# Patient Record
Sex: FEMALE | Race: WHITE
[De-identification: names, ages, dates, MRNs, and addresses within clinical notes are randomized per-mention and may not be internally consistent; named-entity substitution may affect disease eponyms.]

---

## 2017-02-14 ENCOUNTER — HOSPITAL ENCOUNTER (EMERGENCY)
Dept: HOSPITAL 75 - ER | Age: 72
Discharge: HOME | End: 2017-02-14
Payer: MEDICARE

## 2017-02-14 VITALS — WEIGHT: 158 LBS | HEIGHT: 60 IN | BODY MASS INDEX: 31.02 KG/M2

## 2017-02-14 VITALS — SYSTOLIC BLOOD PRESSURE: 142 MMHG | DIASTOLIC BLOOD PRESSURE: 72 MMHG

## 2017-02-14 DIAGNOSIS — G62.9: ICD-10-CM

## 2017-02-14 DIAGNOSIS — R29.6: ICD-10-CM

## 2017-02-14 DIAGNOSIS — S06.0X0A: Primary | ICD-10-CM

## 2017-02-14 DIAGNOSIS — R42: ICD-10-CM
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
## 2017-02-14 DIAGNOSIS — Z79.899: ICD-10-CM
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
## 2017-02-14 DIAGNOSIS — Z79.82: ICD-10-CM

## 2017-02-14 PROCEDURE — 99282 EMERGENCY DEPT VISIT SF MDM: CPT

## 2017-02-14 PROCEDURE — 72125 CT NECK SPINE W/O DYE: CPT

## 2017-02-14 PROCEDURE — 70450 CT HEAD/BRAIN W/O DYE: CPT

## 2017-02-14 NOTE — DIAGNOSTIC IMAGING REPORT
PROCEDURE: CT head and CT cervical spine without contrast.



TECHNIQUE: Multiple contiguous axial images were obtained

through the brain and cervical spine without the use of

intravenous contrast. Sagittal and coronal reformations through

the cervical spine were then performed.



INDICATION: Patient fell and hit head with left-sided head pain

and neck pain.



COMPARISON: Head CT 03/05/2014. CTA of the neck 03/04/2015.



DISCUSSION:



Head: No adverse interval change. No intracranial hemorrhage,

mass, midline shift, or hydrocephalus. The ventricles and sulci

are normal size and configuration for age. The visualized

orbits, paranasal sinuses, mastoid air cells, and calvarium are

unremarkable.



Cervical spine: Moderate degenerative changes of the cervical

spine appear stable. No acute fracture, subluxation, or other

osseous abnormality identified. Alignment is anatomic. Soft

tissues are unremarkable.



IMPRESSION:

1. Stable negative head CT.

2. Stable moderate degenerative changes of the cervical spine.

No acute osseous abnormality identified.



Dictated by:



Dictated on workstation # [id number]

## 2017-02-14 NOTE — XMS REPORT
Continuity of Care Document

 Created on: 2017



Shanae Nelson

External Reference #: [id number]

: 1945

Sex: Female



Demographics







 Address  [address]

[place]  [address]

 

 Home Phone  [phone]

 

 Preferred Language  English

 

 Marital Status  

 

 Quaker Affiliation  CHR

 

 Race  White or 

 

 Ethnic Group  Not  or 





Author







 Author  [facility]

 

 Organization  [facility]

 

 Address  Unknown

 

 Phone  Unavailable







Support







 Name  Relationship  Address  Phone

 

 , Carole Batista  ECON  Unknown  [phone]

 

 , Lauri Nelson  EC[employer]  Unknown  [phone]







Care Team Providers







 Care Team Member Name  Role  Phone

 

 Karen Shanna  PCP  [phone]







Source Comments

Some departments are not documenting in the electronic medical record.  If you 
do not see the information that you expected, contact Release of Information in 
the Health Information Management department at [phone] for further 
assistance in locating additional records.[facility]



Active Allergies and Adverse Reactions







    



  Allergen   Noted Date   Severity   Reactions   Comments

 

    



  Ampicillin   2015   Medium   RASH, ITCHING 

 

    



  Other   2015   Medium   MENTAL STATUS CHANGES,   Unknown "older muscle



     SEE COMMENTS   relaxant", patient was



      hospitalized with



      reaction

 

    



  Prednisone   2015   Medium   MENTAL STATUS CHANGES 

 

    



  Sulfa (Sulfonamide   2015   Medium   RASH 



  Antibiotics)    

 

    



  Trimethoprim   2015   Medium   RASH, ITCHING 







Current Medications







      



  Prescription   Sig.   Disp.   Refills   Start   End Date   Status



      Date  

 

      



  Cholecalciferol (Vitamin   Take  by mouth daily.       Active



  D3) (VITAMIN D-3) 2,000      



  unit cap      

 

      



  cyanocobalamin(DIL)   Inject  to area(s) as       Active



  (VITAMIN B-12, RUBRAMIN)   directed every 30 days.     



  100 mcg/mL      

 

      



  Fenofibrate Micronized   Take  by mouth daily.       Active



  130 mg cap      

 

      



  nebivolol (BYSTOLIC) 10   Take 10 mg by mouth       Active



  mg tablet   daily.     

 

      



  gabapentin (NEURONTIN)   Take 100 mg by mouth       Active



  100 mg capsule   daily.     

 

      



  simvastatin (ZOCOR) 40 mg   Take 40 mg by mouth at       Active



  tablet   bedtime daily.     

 

      



  estrogens, conjugated   Take 1.25 mg by mouth       Active



  (PREMARIN) 1.25 mg tablet   daily. Patient takes 1     



   1/2 tablets daily for a     



   total dose of 1.875mg     

 

      



  glucosamine(+) 500 mg tab   Take 500 mg by mouth       Active



   daily with breakfast.     

 

      



  dexlansoprazole (+)   Take 60 mg by mouth       Active



  (DEXILANT) 60 mg capsule   daily.     

 

      



  imipramine (TOFRANIL) 50   Take 50 mg by mouth at       Active



  mg tablet   bedtime daily.     

 

      



  ALPRAZolam (XANAX) 0.25   Take 0.25 mg by mouth as       Active



  mg tablet   Needed.     

 

      



  aspirin 81 mg chewable   Take 81 mg by mouth       Active



  tablet   daily.     

 

      



  linagliptin (TRADJENTA) 5   Take 5 mg by mouth daily.       Active



  mg tab      

 

      



  psyllium (METAMUCIL)   Take 1 Packet by mouth   60 Packet   1   20    
Active



  packet   twice daily.     15  

 

      



  senna/docusate   Take 1-2 Tabs by mouth at   60 Tab   3   20    Active



  (SENOKOT-S) 8.6/50 mg   bedtime daily.     15  



  tablet      

 

      



  polyethylene glycol 3350   Take 17 g by mouth daily   1 Bottle   1   20
    Active



  (GLYCOLAX; MIRALAX) 17   as needed.     15  



  gram/dose powder      

 

      



  magnesium oxide (MAG-OX)   Take 400 mg by mouth       Active



  400 mg tablet   daily.     

 

      



  ondansetron (ZOFRAN) 4 mg   Take 1 Tab by mouth every   90 Tab   0   20
    Active



  tablet   8 hours as needed for     15  



   Nausea for up to 90     



   doses.     







Active Problems







 



  Problem   Noted Date

 

 



  Nausea   2015

 

 



  Abdominal bloating   2015

 

 



  Constipation   2015

 

 



  Abdominal pain   2015

 

 



  Weight loss   2015







Social History







    



  Tobacco Use   Types   Packs/Day   Years Used   Date

 

    



  Never Smoker    









   



  Alcohol Use   Drinks/Week   oz/Week   Comments

 

   



  No   







Last Filed Vital Signs







  



  Vital Sign   Reading   Time Taken

 

  



  Blood Pressure   151/77   2015  1:38 PM CDT

 

  



  Pulse   73   2015  1:38 PM CDT

 

  



  Temperature   36.6   C (97.9   F)   2015  1:38 PM CDT

 

  



  Respiratory Rate   14   2015  1:38 PM CDT

 

  



  Height   1.524 m (5')   2015  1:38 PM CDT

 

  



  Weight   68.176 kg (150 lb 4.8 oz)   2015  1:38 PM CDT

 

  



  Body Mass Index   29.35   2015  1:38 PM CDT

 

  



  Oxygen Saturation   100%   2015  4:44 PM CDT







Plan of Care







   



  Health Maintenance   Due Date   Last Done   Comments

 

   



  Physical (Comprehensive)   1952  



  Exam   

 

   



  Pertussis Vaccine   1954  

 

   



  Tetanus Vaccine   1960  

 

   



  Breast Cancer Screening   1983  

 

   



  Shingles Vaccine   2003  

 

   



  Osteoporosis Screening   2008  

 

   



  Prevnar/Pneumovax (#1)   2008  

 

   



  Influenza Vaccine   2016  

 

   



  Colorectal Cancer   2025 



  Screening   







Results from Last 3 Months

Not on file

## 2017-02-14 NOTE — ED HEAD INJURY
General


Chief Complaint:  Trauma-Non Activation


Stated Complaint:  FALL/HEAD INJURY


Nursing Triage Note:  


PT STATES FELL AT HOME D/T WEAKNESS IN HANDS AND FEET, STATES HIT HEAD ON DEEP 


FREEZE. NO LOC


Source:  patient


Exam Limitations:  no limitations





History of Present Illness


Time seen by provider:  12:52


Initial Comments


To ER with reports of a fall at home.  She states that she has neuropathy (

evaluated by PCP) and sometimes her feet don't work as they should.  Today this 

caused her to fall and she hit the frontal and left side of her head on the 

deep freeze.  No neck pain or loss of consciousness.  She does have some 

dizziness and a slight headache.  She states that she does have some dizziness 

but this is an intermittent problem over the past 2 years and has been 

evaluated by her primary care provider.


Location Injury Occurred:  HOME


Occurred:  just prior to arrival


Location:  frontal


Loss of Consciousness:  no loss of consciousness


Associated Systoms:   Headaches





Allergies and Home Medications


Allergies


Coded Allergies:  


     NSAIDS (Non-Steroidal Anti-Inflamma (Verified  Allergy, Intermediate, 3/4/

14)


 STATES SHE GETS 'CRAZY'


     Sulfa (Sulfonamide Antibiotics) (Unverified  Allergy, Unknown, 3/4/14)


     ampicillin (Unverified  Allergy, Unknown, 3/4/14)


     prednisone (Unverified  Adverse Reaction, Unknown, 5/1/14)





Home Medications


Alprazolam 0.25 Mg Tablet 0.25 MG PO TID PRN PRN ANXIETY (Reported) 


   AS NEEDED FOR ANXIETY 


Aspirin 81 Mg Tablet. 81 MG PO DAILY (Reported) 


Cholecalciferol (Vitamin D3) 2,000 Unit Capsule 2,000 UNIT PO DAILY (Reported) 


Ciprofloxacin Hcl 250 Mg Tablet 7Days 250 MG PO BID 


   Prescribed by: KAYLEY MURRAY on 5/2/14 0928


Dexlansoprazole 60 Mg Cap. 60 MG PO BID (Reported) 


Estrogens Conjugated 1.25 Mg Tab 1.25 MG PO DAILY (Reported) 


Fenofibrate,Micronized 130 Mg Capsule 130 MG PO DAILY (Reported) 


Gabapentin 100 Mg Cap 200 MG PO HS (Reported) 


   TAKES 2 (100MG) CAPSULES AT BEDTIME 


Gluc 2KCL/Chondr/Osorio Hy/Hy Ac 1 Each Capsule 1 CAP PO DAILY (Reported) 


Imipramine Hcl 50 Mg Tablet 50 MG PO HS (Reported) 


Nebivolol Hcl 10 Mg Tablet 10 MG PO DAILY (Reported) 


Simvastatin 40 Mg Tablet 20 MG PO DAILY (Reported) 


   TAKES 1/2 (40MG) TABLET DAILY 





Constitutional:   see HPI


Eyes:   No Symptoms Reported


Ears, Nose, Mouth, Throat:   no symptoms reported


Respiratory:   no symptoms reported


Cardiovascular:   no symptoms reported


Genitourinary:   no symptoms reported


Musculoskeletal:   no symptoms reported


Skin:   no symptoms reported


Psychiatric/Neurological:   No Symptoms Reported


Endocrine:   No Symptoms Reported


Hematologic/Lymphatic:   No Symptoms Reported





Past Medical-Social-Family Hx


Patient Social History


Alcohol Use:  Denies Use


Recreational Drug Use:  No


Smoking Status:  Never a Smoker


Recent Foreign Travel:  No


Contact w/Someone Who Travel:  No


Recent Infectious Disease Expo:  No


Recent Hopitalizations:  No





Immunizations Up To Date


Tetanus Booster (TDap):  Unknown


Date of Influenza Vaccine:  Oct 1, 2013





Surgeries


HX Surgeries:  Yes (HYSTERECTOMY, GALLBLADDER, KIDNEY REMOVED, APPENDECTOMY)





Respiratory


Hx Respiratory Disorders:  No





Cardiovascular


Hx Cardiac Disorders:  Yes





Neurological


Hx Neurological Disorders:  No





Reproductive System


Hx Reproductive Disorders:  Yes


GYN History:  Hysterectomy





Genitourinary


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





Gastrointestinal


Hx Gastrointestinal Disorders:  Yes (DUMPING SYNDROME)





Musculoskeletal


Hx Musculoskeletal Disorders:  Yes


Musculoskeletal Disorders:  Arthritis





Endocrine


Hx Endocrine Disorders:  No





HEENT


HX ENT Disorders:  No





Cancer


Hx Cancer:  No





Psychosocial


Hx Psychiatric Problems:  Yes


Behavioral Health Disorders:  Anxiety, Depression





Integumentary


HX Skin/Integumentary Disorder:  No





Blood Transfusions


Hx Blood Disorders:  No


Adverse Reaction to a Blood Tr:  No





Family Medical History


Family Medial History:  


Cataract


  03 FATHER


  03 MOTHER


  09 BROTHER


  09 BROTHER


Dementia


  03 MOTHER


Family history: Allergy


  09 BROTHER


Family history: Arthritis


  03 MOTHER


Family history: Diabetes mellitus


  09 BROTHER


Family history: Glaucoma


  03 MOTHER


Family history: Osteoporosis


  03 MOTHER


Hereditary disease


  03 MOTHER


Prostate cancer


  03 FATHER


Stroke


  03 MOTHER





No Family History of:


  Abdominal aortic aneurysm


  [place]'s disease


  Alcoholism


  Aphasia


  Cancer


  Cancer of colon


  Chest pain


  Congenital heart disease


  Congestive heart failure


  Cystic fibrosis


  Dysphagia


  Family history: Alzheimer's disease


  Family history: Asthma


  Family history: Breast disease


  Family history: Cardiovascular disease


  Family history: Coronary thrombosis


  Family history: Gastrointestinal disease


  Family history: Hypertension


  Family history: Thyroid disorder


  Headache


  Hearing loss


  Heart disease


  History of - anemia


  History of - disorder


  History of - respiratory disease


  History of drug abuse


  Human immunodeficiency virus (HIV) seropositivity


  Hypercholesterolemia


  Infertile


  Kidney disease


  Malignant neoplasm of lung


  Myocardial infarction


  Parkinson's disease


  Psychotic disorder


  Seizure disorder


  Tuberculosis


  Visual impairment





Physical Exam


Vital Signs





 Vital Sign - Last 12Hours








 2/14/17





 12:05


 


Temp 97.9


 


Pulse 66


 


Resp 18


 


B/P 146/81


 


Pulse Ox 95





Capillary Refill : Less Than 3 Seconds


General Appearance:   WD/WN no apparent distress


HEENT:   PERRL/EOMI normal ENT inspection other (there is no palpable scalp 

hematoma or laceration)


Neck:   non-tender full range of motion


Respiratory:   no respiratory distress no accessory muscle use


Gastrointestinal:   normal bowel sounds non tender soft


Extremities:   normal range of motion non-tender


Skin:   normal color warm/dry





Anselmo Coma Score


Best Eye Response:  (4) Open Spontaneously


Best Verbal Response:  (5) Oriented


Best Motor Response:  (6) Obeys Commands


Giorgi Total:  15





Progress/Results/Core Measures


Results/Orders


My Orders





 Orders-KARL MONTELONGO


Ct Head/Cervical Spine Wo (2/14/17 11:58)





Vital Signs/I&O





 Vital Sign - Last 12Hours








 2/14/17





 12:05


 


Temp 97.9


 


Pulse 66


 


Resp 18


 


B/P 146/81


 


Pulse Ox 95








Blood Pressure Mean:  102





Departure


Impression


Impression:  


 Primary Impression:  


 Fall


 Additional Impression:  


 Concussion without loss of consciousness


Disposition:  01 HOME, SELF-CARE


Condition:  Stable





Departure-Patient Inst.


Decision time for Depature:  13:04


Referrals:  


KAYLEY MURRAY DO (PCP/Family)


Primary Care Physician


Patient Instructions:  Concussion, Adult (DC)





Add. Discharge Instructions:


1.  Return to ER for any severe intolerable headaches, nausea vomiting or other 

concerns


2.  Go home and rest.  The basis of concussion management is cognitive and 

physical wrist so nothing physically or mentally stimulating.  Unfortunately, 

concussion symptoms such as dizziness, headache may persist up to 3 weeks.  All 

discharge instructions reviewed with patient and/or family. Voiced 

understanding.








KARL MONTELONGO Feb 14, 2017 12:53

## 2018-05-10 ENCOUNTER — HOSPITAL ENCOUNTER (OUTPATIENT)
Dept: HOSPITAL 75 - RAD | Age: 73
End: 2018-05-10
Attending: FAMILY MEDICINE
Payer: MEDICARE

## 2018-05-10 DIAGNOSIS — M51.36: ICD-10-CM

## 2018-05-10 DIAGNOSIS — M99.73: ICD-10-CM

## 2018-05-10 DIAGNOSIS — M51.26: Primary | ICD-10-CM

## 2018-05-10 DIAGNOSIS — G62.9: ICD-10-CM

## 2018-05-10 PROCEDURE — 72148 MRI LUMBAR SPINE W/O DYE: CPT

## 2018-05-10 NOTE — DIAGNOSTIC IMAGING REPORT
CLINICAL INDICATION: Patient with neuropathy and recently has

been falling and having trouble walking.



Exam: MRI of the lumbar spine performed without IV contrast. 

Sagittal T2, sagittal T1, sagittal stir, axial T1, and axial T2.



Comparison: MRI of the lumbar spine without contrast dated

5/12/2011.



Findings:

Stable mild left curvature of the lumbar spine.



Visualized portions of the distal thoracic spinal cord, conus

medullaris, and cauda equina nerve roots are unremarkable. Conus

medullaris tip is seen at the upper L2 vertebral body level.

There is no significant paraspinal soft tissue abnormality. There

is no acute lumbar spine fracture or dislocation. Again seen

slightly heterogeneous high/low T1/T2 signal. There is Modic type

II degenerative signal changes involving the L3-L4 and L4-L5

levels again noted. There is degenerative spurs and facet

arthropathy seen predominantly involving the lower lumbar spine.



L1-L2 and L2-L3: Unremarkable.



L3-L4: There is no significant change to the diffuse disc bulge

with moderate to severe loss of intervertebral disc height. There

is mild central canal narrowing which is not significantly

progressed. There is mild to moderate right neural foramen

narrowing and mild left neural foramen narrowing which is stable.



L4-L5: Again seen diffuse disc bulge with interval slight

decreased size of the left paracentral disc extrusion/herniation

component. There is slight progression of mild-to-moderate facet

arthropathy and ligamentum flavum buckling. There is moderate

central canal narrowing which is grossly similar. There is no

significant right neural foramen narrowing and stable mild to

moderate left neural foramen narrowing.



L5-S1: There is a diffuse disc bulge with mild loss of

intervertebral disc height and mild bilateral facet arthropathy

which is not significantly changed. Stable mild to moderate

bilateral neural foramen narrowing (right side more than the

left).



IMPRESSION:

1: There is interval decreased size of the left paracentral disc

herniation component of the L4-L5 diffuse disc bulge. There is

slight progression of facet arthropathy and ligamentum flavum

buckling. There is relatively stable moderate central canal

narrowing. There is also stable mild to moderate left neural

foramen narrowing.



2: The remainder of the lumbar spine degenerative disease has not

significantly changed.



Dictated by: 



  Dictated on workstation # [id number]

## 2019-07-18 ENCOUNTER — HOSPITAL ENCOUNTER (OUTPATIENT)
Dept: HOSPITAL 75 - RAD | Age: 74
End: 2019-07-18
Attending: FAMILY MEDICINE
Payer: MEDICARE

## 2019-07-18 DIAGNOSIS — R16.0: ICD-10-CM

## 2019-07-18 DIAGNOSIS — R93.2: Primary | ICD-10-CM

## 2019-07-18 DIAGNOSIS — Z90.49: ICD-10-CM

## 2019-07-18 PROCEDURE — 76705 ECHO EXAM OF ABDOMEN: CPT

## 2019-07-18 NOTE — DIAGNOSTIC IMAGING REPORT
Clinical indication: Patient with elevated liver function test.



Exam: Right upper quadrant ultrasound.



Comparison: CT scan of the abdomen and pelvis without contrast

dated 9/22/2014.



Findings:

The liver measures 19.5 cm. There is diffuse hyperechogenicity

seen throughout the liver. The liver surface appears slightly

lobulated. There is no liver mass seen. There is no intrahepatic

or extrahepatic ductal dilation. Common bile measures 7.4 mm. The

gallbladder surgically resected. Portion of the pancreatic tail

is partially obscured. Otherwise visualized portion of the

pancreas is unremarkable. The IVC and aorta are not well

visualized on this exam due to patient body habitus and increased

echogenicity throughout the liver. There is abdominal ascites.



Right kidney shows no mass or hydronephrosis and is grossly

unremarkable. Right kidney measures 10.1 cm in craniocaudal

dimension.



Impression:

1: Hepatomegaly with diffuse hyperechogenicity seen throughout

the liver which may be related to diffuse fatty infiltration and

or chronic hepatocellular disease. There is a minimally lobulated

appearance to the liver surface. This finding may be seen with

cirrhosis. Clinical correlation is suggested.



2: Cholecystectomy.



3:  The remainder of this exam is unremarkable as visualized.



Dictated by: 



  Dictated on workstation # [id number]

## 2020-06-16 ENCOUNTER — HOSPITAL ENCOUNTER (EMERGENCY)
Dept: HOSPITAL 75 - ER | Age: 75
Discharge: HOME | End: 2020-06-16
Payer: MEDICARE

## 2020-06-16 VITALS — WEIGHT: 160.06 LBS | BODY MASS INDEX: 31.42 KG/M2 | HEIGHT: 60 IN

## 2020-06-16 VITALS — SYSTOLIC BLOOD PRESSURE: 178 MMHG | DIASTOLIC BLOOD PRESSURE: 69 MMHG

## 2020-06-16 DIAGNOSIS — Z88.6: ICD-10-CM

## 2020-06-16 DIAGNOSIS — S39.012A: ICD-10-CM

## 2020-06-16 DIAGNOSIS — F32.9: ICD-10-CM

## 2020-06-16 DIAGNOSIS — N18.9: ICD-10-CM
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
## 2020-06-16 DIAGNOSIS — Z88.1: ICD-10-CM

## 2020-06-16 DIAGNOSIS — R19.7: ICD-10-CM

## 2020-06-16 DIAGNOSIS — Z88.8: ICD-10-CM

## 2020-06-16 DIAGNOSIS — Z88.2: ICD-10-CM

## 2020-06-16 DIAGNOSIS — S70.01XA: ICD-10-CM
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
## 2020-06-16 DIAGNOSIS — Z80.42: ICD-10-CM

## 2020-06-16 DIAGNOSIS — S09.90XA: Primary | ICD-10-CM

## 2020-06-16 DIAGNOSIS — D64.9: ICD-10-CM

## 2020-06-16 DIAGNOSIS — F41.9: ICD-10-CM

## 2020-06-16 DIAGNOSIS — Z79.82: ICD-10-CM

## 2020-06-16 DIAGNOSIS — S16.1XXA: ICD-10-CM
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
## 2020-06-16 LAB
ALBUMIN SERPL-MCNC: 3.9 GM/DL (ref 3.2–4.5)
ALP SERPL-CCNC: 100 U/L (ref 40–136)
ALT SERPL-CCNC: 28 U/L (ref 0–55)
APTT PPP: YELLOW S
BACTERIA #/AREA URNS HPF: (no result) /HPF
BASOPHILS # BLD AUTO: 0 10^3/UL (ref 0–0.1)
BASOPHILS NFR BLD AUTO: 0 % (ref 0–10)
BILIRUB SERPL-MCNC: 0.3 MG/DL (ref 0.1–1)
BILIRUB UR QL STRIP: NEGATIVE
BUN/CREAT SERPL: 12
CALCIUM SERPL-MCNC: 9.8 MG/DL (ref 8.5–10.1)
CHLORIDE SERPL-SCNC: 105 MMOL/L (ref 98–107)
CO2 SERPL-SCNC: 20 MMOL/L (ref 21–32)
CREAT SERPL-MCNC: 1.7 MG/DL (ref 0.6–1.3)
EOSINOPHIL # BLD AUTO: 0 10^3/UL (ref 0–0.3)
EOSINOPHIL NFR BLD AUTO: 0 % (ref 0–10)
ERYTHROCYTE [DISTWIDTH] IN BLOOD BY AUTOMATED COUNT: 15.3 % (ref 10–14.5)
FIBRINOGEN PPP-MCNC: CLEAR MG/DL
GFR SERPLBLD BASED ON 1.73 SQ M-ARVRAT: 29 ML/MIN
GLUCOSE SERPL-MCNC: 183 MG/DL (ref 70–105)
GLUCOSE UR STRIP-MCNC: NEGATIVE MG/DL
HCT VFR BLD CALC: 32 % (ref 35–52)
HGB BLD-MCNC: 10 G/DL (ref 11.5–16)
KETONES UR QL STRIP: NEGATIVE
LEUKOCYTE ESTERASE UR QL STRIP: NEGATIVE
LYMPHOCYTES # BLD AUTO: 1.6 X 10^3 (ref 1–4)
LYMPHOCYTES NFR BLD AUTO: 26 % (ref 12–44)
MANUAL DIFFERENTIAL PERFORMED BLD QL: NO
MCH RBC QN AUTO: 26 PG (ref 25–34)
MCHC RBC AUTO-ENTMCNC: 31 G/DL (ref 32–36)
MCV RBC AUTO: 83 FL (ref 80–99)
MONOCYTES # BLD AUTO: 0.5 X 10^3 (ref 0–1)
MONOCYTES NFR BLD AUTO: 8 % (ref 0–12)
NEUTROPHILS # BLD AUTO: 4.2 X 10^3 (ref 1.8–7.8)
NEUTROPHILS NFR BLD AUTO: 66 % (ref 42–75)
NITRITE UR QL STRIP: NEGATIVE
PH UR STRIP: 6 [PH] (ref 5–9)
PLATELET # BLD: 220 10^3/UL (ref 130–400)
PMV BLD AUTO: 11.7 FL (ref 7.4–10.4)
POTASSIUM SERPL-SCNC: 4.8 MMOL/L (ref 3.6–5)
PROT SERPL-MCNC: 7.2 GM/DL (ref 6.4–8.2)
PROT UR QL STRIP: NEGATIVE
RBC #/AREA URNS HPF: (no result) /HPF
SODIUM SERPL-SCNC: 136 MMOL/L (ref 135–145)
SP GR UR STRIP: <=1.005 (ref 1.02–1.02)
SQUAMOUS #/AREA URNS HPF: (no result) /HPF
WBC # BLD AUTO: 6.4 10^3/UL (ref 4.3–11)
WBC #/AREA URNS HPF: (no result) /HPF
YEAST #/AREA URNS HPF: (no result) /HPF

## 2020-06-16 PROCEDURE — 71045 X-RAY EXAM CHEST 1 VIEW: CPT

## 2020-06-16 PROCEDURE — 70450 CT HEAD/BRAIN W/O DYE: CPT

## 2020-06-16 PROCEDURE — 72128 CT CHEST SPINE W/O DYE: CPT

## 2020-06-16 PROCEDURE — 80053 COMPREHEN METABOLIC PANEL: CPT

## 2020-06-16 PROCEDURE — 72125 CT NECK SPINE W/O DYE: CPT

## 2020-06-16 PROCEDURE — 85025 COMPLETE CBC W/AUTO DIFF WBC: CPT

## 2020-06-16 PROCEDURE — 81000 URINALYSIS NONAUTO W/SCOPE: CPT

## 2020-06-16 PROCEDURE — 93041 RHYTHM ECG TRACING: CPT

## 2020-06-16 PROCEDURE — 72131 CT LUMBAR SPINE W/O DYE: CPT

## 2020-06-16 PROCEDURE — 36415 COLL VENOUS BLD VENIPUNCTURE: CPT

## 2020-06-16 NOTE — DIAGNOSTIC IMAGING REPORT
INDICATION: Fall with chest discomfort.



Frontal chest obtained at 0245 p.m. and compared to 09/29/2016.



Heart is borderline in size. Mediastinal silhouette is

unremarkable. The lungs are clear. There is no pneumothorax or

pleural fluid. There is no overt bony abnormality in the chest.



IMPRESSION:



Borderline heart size with no acute process in the chest.



Dictated by: 



  Dictated on workstation # [id number]

## 2020-06-16 NOTE — NUR
PER PT REQUEST, THIS RN CONTACTED , YOLANDA PAZ, TO UPDATE ON CONDITION 
OF PT. YOLANDA VOICES NO FURTHER QUESTIONS OR CONCERNS. 



([phone])

## 2020-06-16 NOTE — ED FALL/INJURY
General


Stated Complaint:  FALL


Source:  patient





History of Present Illness


Date Seen by Provider:  Jun 16, 2020


Time Seen by Provider:  13:47


Initial Comments


PT ARRIVES VIA POV FROM DR. MURRAY'S OFFICE


PT HAD A SCHEDULED ROUTINE APPOINTMENT TODAY, AND 45 MINUTES AGO, WHILE SHE WAS 

AT HOME, SHE LOST HER BALANCE AND FELL, HITTING HER HEAD, LANDING ON CARPETED 

FLOOR, ONTO HER BACK AND RIGHT SIDE. 


NO LOSS OF CONSCIOUSNESS


NO NECK PAIN 


C/O BLURRY VISION


C/O RIGHT SIDED HEADACHE


C/O RIGHT HIP PAIN 





NO CHANGE IN CHRONIC BACK PAIN


HAS CHRONIC HIP PAIN, AND STATES IT IS NOT ANY DIFFERENT THAN NORMAL





HAS PERIPHERAL NEUROPATHY, AND NO INCREASE IN SYMPTOMS





MUCH LATER, PT C/O GENERALIZED WEAKNESS


MUCH LATER, SHE REPORTS THAT SHE HAS HAD DIARRHEA FOR THE LAST COUPLE OF 

DAYS--NO BLACK/BLOODY/TARRY STOOLS


NO NAUSEA/VOMITING


NO ABDOMINAL PAIN 


NO FEVER


NO URINARY SYMPTOMS AND VOIDING A NORMAL AMOUNT. 





CERVICAL COLLAR PLACED ON ARRIVAL





PCP: DR. MURRAY





Allergies and Home Medications


Allergies


Coded Allergies:  


     NSAIDS (Non-Steroidal Anti-Inflamma (Verified  Allergy, Intermediate, 

3/4/14)


   


   STATES SHE GETS 'CRAZY'


     Sulfa (Sulfonamide Antibiotics) (Unverified  Allergy, Unknown, 3/4/14)


     ampicillin (Unverified  Allergy, Unknown, 3/4/14)


     prednisone (Unverified  Adverse Reaction, Unknown, 5/1/14)





Home Medications


Alprazolam 0.25 Mg Tablet, 0.25 MG PO TID PRN for ANXIETY, (Reported)


   AS NEEDED FOR ANXIETY 


Aspirin 81 Mg Tablet., 81 MG PO DAILY, (Reported)


Cholecalciferol (Vitamin D3) 2,000 Unit Capsule, 2,000 UNIT PO DAILY, (Reported)


Ciprofloxacin Hcl 250 Mg Tablet, 250 MG PO BID


   Prescribed by: KAYLEY MURARY on 5/2/14 0928


Dexlansoprazole 60 Mg Cap., 60 MG PO BID, (Reported)


Estrogens Conjugated 1.25 Mg Tab, 1.25 MG PO DAILY, (Reported)


Fenofibrate,Micronized 130 Mg Capsule, 130 MG PO DAILY, (Reported)


Gabapentin 100 Mg Cap, 200 MG PO HS, (Reported)


   TAKES 2 (100MG) CAPSULES AT BEDTIME 


Gluc 2KCL/Chondr/Osorio Hy/Hy Ac 1 Each Capsule, 1 CAP PO DAILY, (Reported)


Imipramine Hcl 50 Mg Tablet, 50 MG PO HS, (Reported)


Nebivolol Hcl 10 Mg Tablet, 10 MG PO DAILY, (Reported)


Simvastatin 40 Mg Tablet, 20 MG PO DAILY, (Reported)


   TAKES 1/2 (40MG) TABLET DAILY 





Patient Home Medication List


Home Medication List Reviewed:  Yes





Review of Systems


Review of Systems


Constitutional:  see HPI; No chills, No diaphoresis, No dizziness, No fever; 

malaise, weakness


Eyes:  No Symptoms Reported


Ears, Nose, Mouth, Throat:  no symptoms reported


Respiratory:  no symptoms reported


Cardiovascular:  no symptoms reported


Gastrointestinal:  see HPI; No abdominal pain; diarrhea; No nausea, No vomiting


Genitourinary:  no symptoms reported


Musculoskeletal:  see HPI


Skin:  no symptoms reported


Psychiatric/Neurological:  See HPI; Denies Headache; Pre-Existing Deficit 

(PERIPHERAL NEUROPATHY)





Past Medical-Social-Family Hx


Past Med/Social Hx:  Reviewed and Corrections made


Patient Social History


Alcohol Use:  Denies Use


Recreational Drug Use:  No


Smoking Status:  Never a Smoker


Recent Hopitalizations:  No





Immunizations Up To Date


Tetanus Booster (TDap):  Unknown


Date of Influenza Vaccine:  Oct 1, 2013





Past Medical History


Surgeries:  Yes


Hysterectomy


Respiratory:  No


Cardiac:  Yes


High Cholesterol, Hypertension


Neurological:  Yes


Neuropathy


Reproductive Disorders:  Yes


GYN History:  Hysterectomy, Menopausal


Genitourinary:  Yes (CHRONIC RENAL INSUFFICIENCY)


Gastrointestinal:  No


Musculoskeletal:  Yes (CHRONIC HIP PAIN )


Arthritis, Chronic Back Pain


Endocrine:  No


HEENT:  No


Psychosocial:  Yes


Anxiety, Depression


Integumentary:  No


Blood Disorders:  Yes (CHRONIC ANEMIA)


Adverse Reaction/Blood Tranf:  No





Family Medical History





Cataract


  03 FATHER


  03 MOTHER


  09 BROTHER


  09 BROTHER


Dementia


  03 MOTHER


Family history: Allergy


  09 BROTHER


Family history: Arthritis


  03 MOTHER


Family history: Diabetes mellitus


  09 BROTHER


Family history: Glaucoma


  03 MOTHER


Family history: Osteoporosis


  03 MOTHER


Hereditary disease


  03 MOTHER


Prostate cancer


  03 FATHER


Stroke


  03 MOTHER





No Family History of:


  Abdominal aortic aneurysm


  Dixon's disease


  Alcoholism


  Aphasia


  Cancer


  Cancer of colon


  Chest pain


  Congenital heart disease


  Congestive heart failure


  Cystic fibrosis


  Dysphagia


  Family history: Alzheimer's disease


  Family history: Asthma


  Family history: Breast disease


  Family history: Cardiovascular disease


  Family history: Coronary thrombosis


  Family history: Gastrointestinal disease


  Family history: Hypertension


  Family history: Thyroid disorder


  Headache


  Hearing loss


  Heart disease


  History of - anemia


  History of - disorder


  History of - respiratory disease


  History of drug abuse


  Human immunodeficiency virus (HIV) seropositivity


  Hypercholesterolemia


  Infertile


  Kidney disease


  Malignant neoplasm of lung


  Myocardial infarction


  Parkinson's disease


  Psychotic disorder


  Seizure disorder


  Tuberculosis


  Visual impairment





Physical Exam


Vital Signs





Vital Signs - First Documented








 6/16/20





 13:45


 


Temp 36.8


 


Pulse 81


 


Resp 18


 


B/P (MAP) 160/65 (96)


 


Pulse Ox 99


 


O2 Delivery Room Air





Capillary Refill :


Height, Weight, BMI


Height: 5'0.00"


Weight: 158lbs. 0.0oz. [id number]; 30.85 BMI


Method:Stated


General Appearance:  WD/WN, no apparent distress


HEENT:  PERRL/EOMI, normal ENT inspection, TMs normal, pharynx normal, other (NO

EXTERNAL EVIDENCE OF TRAUMA TO HEAD)


Neck:  non-tender, other (CERVICAL COLLAR APPLIED ON ARRIVAL)


Cardiovascular:  normal peripheral pulses, regular rate, rhythm, no edema, no 

JVD, no murmur


Respiratory:  chest non-tender, normal breath sounds, no respiratory distress, 

no accessory muscle use


Peripheral Pulses:  1+ Dorsalis Pedis (R), 1+ Left Dors-Pedis (L), 1+ Radial 

Pulses (R), 1+ Radial Pulses (L)


Gastrointestinal:  normal bowel sounds, non tender, soft


Back:  no CVA tenderness, other (MID AND LOWER BACK TENDERNESS)


Extremities:  no pedal edema, no calf tenderness, normal capillary refill, other

(RGHT HIP TENDERNESS)


Neurologic/Psychiatric:  CNs II-XII nml as tested, no motor/sensory deficits, 

alert, normal mood/affect, oriented x 3


Skin:  normal color, warm/dry; No ecchymosis; other (NO EXTERNAL EVIDENCE OF 

TRAUMA ANYWHERE)





Hoven Coma Score


Best Eye Response:  (4) Open Spontaneously


Best Verbal Response:  (5) Oriented


Best Motor Response:  (6) Obeys Commands


Giorgi Total:  15





Progress/Results/Core Measures


Results/Orders


Lab Results





Laboratory Tests








Test


 6/16/20


14:05 6/16/20


15:20 Range/Units


 


 


White Blood Count


 6.4 


 


 4.3-11.0


10^3/uL


 


Red Blood Count


 3.88 L


 


 4.35-5.85


10^6/uL


 


Hemoglobin 10.0 L  11.5-16.0  G/DL


 


Hematocrit 32 L  35-52  %


 


Mean Corpuscular Volume 83   80-99  FL


 


Mean Corpuscular Hemoglobin 26   25-34  PG


 


Mean Corpuscular Hemoglobin


Concent 31 L


 


 32-36  G/DL





 


Red Cell Distribution Width 15.3 H  10.0-14.5  %


 


Platelet Count


 220 


 


 130-400


10^3/uL


 


Mean Platelet Volume 11.7 H  7.4-10.4  FL


 


Neutrophils (%) (Auto) 66   42-75  %


 


Lymphocytes (%) (Auto) 26   12-44  %


 


Monocytes (%) (Auto) 8   0-12  %


 


Eosinophils (%) (Auto) 0   0-10  %


 


Basophils (%) (Auto) 0   0-10  %


 


Neutrophils # (Auto) 4.2   1.8-7.8  X 10^3


 


Lymphocytes # (Auto) 1.6   1.0-4.0  X 10^3


 


Monocytes # (Auto) 0.5   0.0-1.0  X 10^3


 


Eosinophils # (Auto)


 0.0 


 


 0.0-0.3


10^3/uL


 


Basophils # (Auto)


 0.0 


 


 0.0-0.1


10^3/uL


 


Sodium Level 136   135-145  MMOL/L


 


Potassium Level 4.8   3.6-5.0  MMOL/L


 


Chloride Level 105     MMOL/L


 


Carbon Dioxide Level 20 L  21-32  MMOL/L


 


Anion Gap 11   5-14  MMOL/L


 


Blood Urea Nitrogen 21 H  7-18  MG/DL


 


Creatinine


 1.70 H


 


 0.60-1.30


MG/DL


 


Estimat Glomerular Filtration


Rate 29 


 


  





 


BUN/Creatinine Ratio 12    


 


Glucose Level 183 H    MG/DL


 


Calcium Level 9.8   8.5-10.1  MG/DL


 


Corrected Calcium 9.9   8.5-10.1  MG/DL


 


Total Bilirubin 0.3   0.1-1.0  MG/DL


 


Aspartate Amino Transf


(AST/SGOT) 46 H


 


 5-34  U/L





 


Alanine Aminotransferase


(ALT/SGPT) 28 


 


 0-55  U/L





 


Alkaline Phosphatase 100     U/L


 


Total Protein 7.2   6.4-8.2  GM/DL


 


Albumin 3.9   3.2-4.5  GM/DL


 


Urine Color  YELLOW   


 


Urine Clarity  CLEAR   


 


Urine pH  6.0  5-9  


 


Urine Specific Gravity  <=1.005  1.016-1.022  


 


Urine Protein  NEGATIVE  NEGATIVE  


 


Urine Glucose (UA)  NEGATIVE  NEGATIVE  


 


Urine Ketones  NEGATIVE  NEGATIVE  


 


Urine Nitrite  NEGATIVE  NEGATIVE  


 


Urine Bilirubin  NEGATIVE  NEGATIVE  


 


Urine Urobilinogen  0.2  < = 1.0  MG/DL


 


Urine Leukocyte Esterase  NEGATIVE  NEGATIVE  


 


Urine RBC (Auto)  NEGATIVE  NEGATIVE  


 


Urine RBC  NONE   /HPF


 


Urine WBC  NONE   /HPF


 


Urine Squamous Epithelial


Cells 


 2-5 


  /HPF





 


Urine Crystals  NONE   /LPF


 


Urine Bacteria  FEW H  /HPF


 


Urine Casts  NONE   /LPF


 


Urine Mucus  NEGATIVE   /LPF


 


Urine Yeast  FEW H  /HPF


 


Urine Culture Indicated  NO   








My Orders





Orders - GRACIE PELAEZ DO


Ct Head/Cervical Spine Wo (6/16/20 13:43)


Ct Thoracic/Lumbar Spine Wo (6/16/20 13:43)


Monitor-Rhythm Ecg Trace Only (6/16/20 13:43)


Cervical Collar (6/16/20 13:43)


Cbc With Automated Diff (6/16/20 13:43)


Comprehensive Metabolic Panel (6/16/20 13:43)


Ua Culture If Indicated (6/16/20 13:43)


Chest 1 View, Ap/Pa Only (6/16/20 13:43)


Pelvis With Right Hip 2-3views (6/16/20 13:43)


Ed Iv/Invasive Line Start (6/16/20 15:06)


Lactated Ringers (Lr 1000 Ml Iv Solution (6/16/20 15:06)


Ed Iv/Invasive Line Start (6/16/20 15:21)


Lactated Ringers (Lr 1000 Ml Iv Solution (6/16/20 15:21)





Medications Given in ED





Vital Signs/I&O











 6/16/20 6/16/20





 13:45 16:43


 


Temp 36.8 36.6


 


Pulse 81 82


 


Resp 18 18


 


B/P (MAP) 160/65 (96) 178/69


 


Pulse Ox 99 99


 


O2 Delivery Room Air Room Air











Progress


Progress Note :  


Progress Note


1515--CERVICAL COLLAR REMOVED ON OBTAINING CT CERVICAL SPINE REPORT BY 

RADIOLOGIST





UNEVENTFUL ER STAY


NO DIARRHEA DURING ER STAY





PT DECLINED SECOND BAG OF IV FLUIDS AND IS ANXIOUS TO GO HOME


STATES SHE FEELS BETTER





Diagnostic Imaging





Comments


CT HEAD/CERVICAL SPINE





IMPRESSION:  


1. No hemorrhage or focal intra-axial mass.  No CT evidence of


large acute territorial ischemia.  


2. No acute fracture or dislocation in the cervical spine.


3. Moderate degenerative changes in the cervical spine, similar


to the prior exam.








CT THORACIC/LUMBAR SPINE


IMPRESSION: 


Progressive L3-L4 spondylosis but no thoracolumbar fracture or


traumatic malalignment. No acute appearing abnormality.








CXR


IMPRESSION:





Borderline heart size with no acute process in the chest.





PELVIS AND RIGHT HIP XRAYS


IMPRESSION: 


1. No acute osseous abnormality with low-grade degenerative


changes.








ALL PER RADIOLOGIST REPORTS AT 1515


   Reviewed:  Reviewed by Me





Departure


Communication (Admissions)


1985--SPOKE WITH DR. MURRAY. SHE REPORTS THAT PT HAS CHRONIC RENAL 

INSUFFICIENCY AND ANEMIA OF CHRONIC DISEASE. SHE DOES NOT ADVISE ADMIT AT THIS 

TIME, SHE WILL FOLLOW UP WITH PT IN OFFICE THIS WEEK,





Impression





   Primary Impression:  


   Status post fall


   Additional Impressions:  


   Closed head injury without loss of consciousness


   Contusion of right hip


   NECK AND BACK STRAIN


   Chronic renal insufficiency


   Chronic anemia


   Generalized weakness


   Diarrhea


Disposition:  01 HOME, SELF-CARE


Condition:  Stable





Departure-Patient Inst.


Referrals:  


KAYLEY MURRAY DO (PCP/Family)


Primary Care Physician


Patient Instructions:  Back Muscle Strain (DC), Cervical Muscle Strain (DC), 

Contusion (DC), Minor Head Injury (DC), Preventing Falls in the Older Adult





Add. Discharge Instructions:  


ICE TO SORE AREAS AT 20 MINUTE INTERVALS





TYLENOL AS NEEDED FOR PAIN 





INCREASE YOUR FLUID INTAKE--WATER, BROTH, JELLO, GATORADE


BRATS DIET--BANANAS, RICE, APPLESAUCE, TOAST, SALTINES





FOLLOW UP WITH DR. MURRAY THIS WEEK FOR FURTHER CARE











GRACIE PELAEZ DO                 Jun 16, 2020 13:46

## 2020-06-16 NOTE — DIAGNOSTIC IMAGING REPORT
PROCEDURE: 

CT thoracic and lumbar spine without contrast.



TECHNIQUE: 

Multiple contiguous axial images were obtained through the

thoracic and lumbar spine without the use of intravenous

contrast. Sagittal and coronal reformations were then performed.

All CT scans use one or more of the following dose optimizing

techniques: automated exposure control, MA and/or KvP adjustment

based on a patient size and exam type, or iterative

reconstruction. 



INDICATION:  

Injury with back pain.



COMPARISON:  

Comparison is limited to a lumbar MRI performed in 2011 and

previous chest x-rays.



FINDINGS:

The thoracolumbar vertebral statures are within normal limits.

There are degenerative changes throughout the thoracolumbar

spine, most severe at the L3-L4 level, where there is marked disc

space narrowing, endplate sclerosis, and osteophytes. When given

the differing modalities, this is significantly progressed from

the comparison MRI performed in 2011. No evidence for

paravertebral hemorrhage and no thoracolumbar fracture is found.

The posterior visualized ribs segments are intact. No

demonstrated pleural fluid. No paraspinal hemorrhage. The SI

joints and visualized sacral ala are intact. 



IMPRESSION: 

Progressive L3-L4 spondylosis but no thoracolumbar fracture or

traumatic malalignment. No acute appearing abnormality.



Dictated by: 



  Dictated on workstation # [id number]

## 2020-06-16 NOTE — DIAGNOSTIC IMAGING REPORT
PROCEDURE: CT head and CT cervical spine without contrast.



TECHNIQUE: Multiple contiguous axial images were obtained through

the brain and cervical spine without the use of intravenous

contrast. Sagittal and coronal reformations through the cervical

spine were then performed. Auto Exposure Controls were utilized

during the CT exam to meet ALARA standards for radiation dose

reduction. 



INDICATION: Fall. Right neck pain. Scalp contusion.



COMPARISON: 02/14/2017.



FINDINGS: 

CT head: No large acute territorial ischemia, mass, or

hemorrhage. No midline shift or mass effect. The ventricles,

cortical sulci, and basilar cisterns are patent and unremarkable.





The calvarium is intact. The visualized paranasal sinuses are

clear.



CT cervical spine: No acute fracture or dislocation is seen in

the cervical spine. There is straightening of the cervical spine.

No focal osseous lesions. Vertebral body heights are

well-maintained. The craniocervical junction is well-maintained.

Moderate degenerative changes are seen in the cervical spine with

disc osteophyte complexes and uncovertebral arthropathy, greatest

at C6-C7. Soft tissues of the neck are unremarkable. The included

lung apices are clear.



IMPRESSION:  

1. No hemorrhage or focal intra-axial mass.  No CT evidence of

large acute territorial ischemia.  

2. No acute fracture or dislocation in the cervical spine.

3. Moderate degenerative changes in the cervical spine, similar

to the prior exam.



Dictated by: 



  Dictated on workstation # [id number]

## 2020-07-20 ENCOUNTER — HOSPITAL ENCOUNTER (OUTPATIENT)
Dept: HOSPITAL 75 - PREOP | Age: 75
Discharge: HOME | End: 2020-07-20
Attending: SURGERY
Payer: MEDICARE

## 2020-07-20 VITALS — BODY MASS INDEX: 31.59 KG/M2 | WEIGHT: 167.33 LBS | HEIGHT: 60.98 IN

## 2020-07-20 DIAGNOSIS — R22.31: ICD-10-CM

## 2020-07-20 DIAGNOSIS — Z01.812: Primary | ICD-10-CM

## 2020-07-20 DIAGNOSIS — Z20.828: ICD-10-CM

## 2020-07-20 PROCEDURE — 87635 SARS-COV-2 COVID-19 AMP PRB: CPT

## 2020-07-22 ENCOUNTER — HOSPITAL ENCOUNTER (OUTPATIENT)
Dept: HOSPITAL 75 - SDC | Age: 75
Discharge: HOME | End: 2020-07-22
Attending: SURGERY
Payer: MEDICARE

## 2020-07-22 VITALS — DIASTOLIC BLOOD PRESSURE: 65 MMHG | SYSTOLIC BLOOD PRESSURE: 154 MMHG

## 2020-07-22 VITALS — DIASTOLIC BLOOD PRESSURE: 57 MMHG | SYSTOLIC BLOOD PRESSURE: 157 MMHG

## 2020-07-22 VITALS — SYSTOLIC BLOOD PRESSURE: 155 MMHG | DIASTOLIC BLOOD PRESSURE: 57 MMHG

## 2020-07-22 VITALS — SYSTOLIC BLOOD PRESSURE: 122 MMHG | DIASTOLIC BLOOD PRESSURE: 57 MMHG

## 2020-07-22 VITALS — SYSTOLIC BLOOD PRESSURE: 157 MMHG | DIASTOLIC BLOOD PRESSURE: 57 MMHG

## 2020-07-22 VITALS — SYSTOLIC BLOOD PRESSURE: 151 MMHG | DIASTOLIC BLOOD PRESSURE: 83 MMHG

## 2020-07-22 VITALS — DIASTOLIC BLOOD PRESSURE: 73 MMHG | SYSTOLIC BLOOD PRESSURE: 156 MMHG

## 2020-07-22 VITALS — DIASTOLIC BLOOD PRESSURE: 56 MMHG | SYSTOLIC BLOOD PRESSURE: 156 MMHG

## 2020-07-22 VITALS — BODY MASS INDEX: 31.59 KG/M2 | WEIGHT: 167.33 LBS | HEIGHT: 60.98 IN

## 2020-07-22 DIAGNOSIS — K76.0: ICD-10-CM

## 2020-07-22 DIAGNOSIS — F41.9: ICD-10-CM

## 2020-07-22 DIAGNOSIS — I10: ICD-10-CM

## 2020-07-22 DIAGNOSIS — E11.9: ICD-10-CM

## 2020-07-22 DIAGNOSIS — Z82.3: ICD-10-CM

## 2020-07-22 DIAGNOSIS — I65.23: ICD-10-CM

## 2020-07-22 DIAGNOSIS — Z88.6: ICD-10-CM

## 2020-07-22 DIAGNOSIS — Z88.1: ICD-10-CM

## 2020-07-22 DIAGNOSIS — Z79.82: ICD-10-CM

## 2020-07-22 DIAGNOSIS — Z80.9: ICD-10-CM

## 2020-07-22 DIAGNOSIS — F32.9: ICD-10-CM

## 2020-07-22 DIAGNOSIS — K21.9: ICD-10-CM

## 2020-07-22 DIAGNOSIS — I08.3: ICD-10-CM

## 2020-07-22 DIAGNOSIS — C44.622: Primary | ICD-10-CM

## 2020-07-22 DIAGNOSIS — E66.9: ICD-10-CM

## 2020-07-22 DIAGNOSIS — Z88.2: ICD-10-CM

## 2020-07-22 DIAGNOSIS — Z79.899: ICD-10-CM

## 2020-07-22 DIAGNOSIS — Z88.8: ICD-10-CM

## 2020-07-22 PROCEDURE — 87081 CULTURE SCREEN ONLY: CPT

## 2020-07-22 NOTE — ANESTHESIA-GENERAL POST-OP
MAC


Patient Condition


Mental Status/LOC:  Same as Preop


Cardiovascular:  Satisfactory


Nausea/Vomiting:  Absent


Respiratory:  Satisfactory


Pain:  Controlled


Complications:  Absent





Post Op Complications


Complications


None





Follow Up Care/Instructions


Patient Instructions


None needed.





Anesthesiology Discharge Order


Discharge Order


Patient is doing well, no complaints, stable vital signs, no apparent adverse 

anesthesia problems.   


No complications reported per nursing.











RAJINDER GARCIA CRNA              Jul 22, 2020 14:54

## 2020-07-22 NOTE — PROGRESS NOTE-POST OPERATIVE
Post-Operative Progess Note


Surgeon (s)/Assistant (s)


Surgeon


JAIR WRIGHT DO


Assistant:  NONE





Pre-Operative Diagnosis


Right hand mass, site marked





Post-Operative Diagnosis





same pending path





Procedure & Operative Findings


Date of Procedure


7/22/20


Procedure Performed/Findings


Exc right hand mass, 3.2 x 1.4cm


Anesthesia Type


MAC





Estimated Blood Loss


Estimated blood loss (mL):  less than 5ml





Specimens/Packing


Specimens Removed


right hand mass











JAIR WRIGHT DO               Jul 22, 2020 14:50

## 2020-07-22 NOTE — PROGRESS NOTE-PRE OPERATIVE
Pre-Operative Progress Note


H&P Reviewed


The H&P was reviewed, patient examined and no changes noted.


Time Seen by Provider:  13:15


Date H&P Reviewed:  Jul 22, 2020


Time H&P Reviewed:  13:15


Pre-Operative Diagnosis:  Right hand mass, site marked











JAIR WRIGHT DO               Jul 22, 2020 13:17

## 2020-07-22 NOTE — DISCHARGE INST-SURGICAL
Discharge Inst-Surgical


Depart Medication/Instructions


New, Converted or Re-Newed RX:  RX Given to Pt/Family


Patient Instructions


Follow up Appt:


Make appointment for 1 week. [phone]





Instructions:


No strenuous activity. 


May shower in 24 hours, no tub bath or soaking.


Use incentive spirometer at home as directed.


No Smoking





Skin/Wound Care:


May remove bandages in am.  You need to leave the Dermabond on incision it will 

fall off on it's own. 





Symptoms to Report:


Appetite Changes, Extremity Discoloration, Numbness/Tingling, Swelling 

Increased, Bleeding Excessive, Eyesight Changes, Pain Increased, Urine Color Nadeen

nge, Constipation(Persistent), Fever over 101 degree F, Pain/Pressure in chest, 

Urinating Difficulty, Cough Up/Vomit Blood, Heart Beat Irreg/Pounding, 

Pain/Pressure in jaw, Cramps in feet or legs, Lightheadedness, Pain/Pressure in 

shoulder, Diarrhea(Persistent), Memory Changes Suddenly, Questions/Concerns, 

Weight gain consecutive days, Dizziness/Fainting, Nausea/Vomiting, Shortness of 

Breath, Weight gain over 2 pounds








If questions or concerns contact your physician 


Or seek help at emergency department.





Activity


Activity as Tolerated:  Yes


Activity Instructions:  Avoid Stress to Incision


Driving Instructions:  No Driving/Refer to Dr. Cook


Discharge Diet:  No Restrictions


Diet After 24 Hours:  Clear Liquid if Nauseous


If Any Problems/Questions/Issu:  Contact Your Physician, Go to Emergency Room





Skin/Wound Care


Infection Signs and Symptoms:  Increased Redness, Foul Odor of Wound, Increased 

Drainage, Skin Itchy or Has a Rash, Increased Swelling, Temperature Above 101  F


Bathing Instructions:  Shower


Stitches/Staples/Dermabond Dis:  Care of Stitches











JAIR WRIGHT DO               Jul 22, 2020 14:51

## 2020-07-23 NOTE — OPERATIVE REPORT
DATE OF SERVICE:  07/22/2020



PREOPERATIVE DIAGNOSIS:

Right hand mass.



POSTOPERATIVE DIAGNOSIS:

Right hand mass, pending pathology.



PROCEDURE:

Excision of right hand mass measuring 3.2 x 1.4 cm down into the subcutaneous

fat.



SURGEON:

Rafael Mendez DO



FIRST ASSISTANT:

None.



ANESTHESIA:

MAC.



SPECIMEN:

Right hand mass measuring 3.2 x 1.4 cm.



BLOOD LOSS:

Less than 5 mL.



FLUIDS:

Per anesthesia.



POSTOPERATIVE CONDITION:

Stable.



INDICATION FOR PROCEDURE:

The patient is a 74-year-old female who has a mass on her right hand that is

getting bigger and has not gone away since removed.



FINDINGS:

The patient had a mass removed and sent to pathology, measured 3.2 x 1.4 cm and

was down through into the subcutaneous fat.



PROCEDURE NOTE:

After informed consent was obtained, the patient was brought to the operating

room, placed on the table in supine position.  She was sterilely prepped and

draped in normal fashion.  Local lidocaine was used to infiltrate the skin

around this mass and then migdalia an elliptical incision with a marking pen and

then using #15 blade to cut along this marking.  This measured 3.2 x 1.4 cm,

able to go down through the skin into subcutaneous tissue, then deepened down to

subcutaneous tissue with Bovie electrocautery.  She is very thin and had very

thin skin, able to remove this, passed off table, marked it superiorly and

laterally, although technically that was actually marked at the 3 o'clock, which

is medial, but would be lateral and dorsal position.  This was passed off the

table.  Hemostasis obtained using Bovie electrocautery and closed the incision

with 3-0 prolene interrupted simple stitches.  Area was then cleaned and dried,

dressing placed.  The patient tolerated the procedure.  She was transferred to

recovery room in stable condition.  Sponge, instrument and needle count correct

at the end of the case.





Job ID: [id number]

DocumentID: [id number]

Dictated Date:  07/22/2020 15:58:52

Transcription Date: 07/23/2020 02:41:24

Dictated By: RAFAEL MENDEZ DO

[facility]

## 2020-09-28 ENCOUNTER — HOSPITAL ENCOUNTER (OUTPATIENT)
Dept: HOSPITAL 75 - LABNPT | Age: 75
End: 2020-09-28
Attending: FAMILY MEDICINE
Payer: MEDICARE

## 2020-09-28 DIAGNOSIS — Z20.828: ICD-10-CM

## 2020-09-28 DIAGNOSIS — Z01.812: Primary | ICD-10-CM

## 2020-09-28 PROCEDURE — 87635 SARS-COV-2 COVID-19 AMP PRB: CPT

## 2020-12-11 NOTE — DIAGNOSTIC IMAGING REPORT
INDICATION: Fall, pain



COMPARISON: 12/15/2010



TECHNIQUE: 3 radiographs of the pelvis and right hip dated

06/16/2020.



FINDINGS: No acute fracture or dislocation. No destructive

osseous process. Mild degenerative changes within the bilateral

hips and pubic symphysis. The right femoral head maintains its

normal shape and contour without collapse. The sacroiliac joints

are intact. No suspicious radiopaque foreign body.



IMPRESSION: 

1. No acute osseous abnormality with low-grade degenerative

changes.



Dictated by: 



  Dictated on workstation # [id number] Patient exposed to her Aunt who was positive for Covid on Sunday. Patient's mother denies symptoms

## 2021-06-06 ENCOUNTER — HOSPITAL ENCOUNTER (EMERGENCY)
Dept: HOSPITAL 75 - ER | Age: 76
Discharge: HOME | End: 2021-06-06
Payer: MEDICARE

## 2021-06-06 VITALS — DIASTOLIC BLOOD PRESSURE: 64 MMHG | SYSTOLIC BLOOD PRESSURE: 189 MMHG

## 2021-06-06 VITALS — HEIGHT: 61.97 IN | BODY MASS INDEX: 29.49 KG/M2 | WEIGHT: 160.28 LBS

## 2021-06-06 DIAGNOSIS — E11.40: ICD-10-CM

## 2021-06-06 DIAGNOSIS — K21.9: ICD-10-CM
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
## 2021-06-06 DIAGNOSIS — I12.9: ICD-10-CM

## 2021-06-06 DIAGNOSIS — F32.9: ICD-10-CM

## 2021-06-06 DIAGNOSIS — R10.30: ICD-10-CM

## 2021-06-06 DIAGNOSIS — Z79.899: ICD-10-CM

## 2021-06-06 DIAGNOSIS — F41.9: ICD-10-CM

## 2021-06-06 DIAGNOSIS — Z79.891: ICD-10-CM

## 2021-06-06 DIAGNOSIS — N18.9: ICD-10-CM

## 2021-06-06 DIAGNOSIS — E87.1: ICD-10-CM

## 2021-06-06 DIAGNOSIS — M79.642: ICD-10-CM

## 2021-06-06 DIAGNOSIS — G89.29: ICD-10-CM

## 2021-06-06 DIAGNOSIS — M54.9: ICD-10-CM

## 2021-06-06 DIAGNOSIS — E11.22: ICD-10-CM

## 2021-06-06 DIAGNOSIS — M54.5: ICD-10-CM

## 2021-06-06 DIAGNOSIS — S00.83XA: Primary | ICD-10-CM

## 2021-06-06 DIAGNOSIS — Z79.84: ICD-10-CM

## 2021-06-06 LAB
ALBUMIN SERPL-MCNC: 3.7 GM/DL (ref 3.2–4.5)
ALP SERPL-CCNC: 64 U/L (ref 40–136)
ALT SERPL-CCNC: 19 U/L (ref 0–55)
APTT PPP: YELLOW S
BACTERIA #/AREA URNS HPF: (no result) /HPF
BASOPHILS # BLD AUTO: 0 10^3/UL (ref 0–0.1)
BASOPHILS NFR BLD AUTO: 0 % (ref 0–10)
BILIRUB SERPL-MCNC: 0.5 MG/DL (ref 0.1–1)
BILIRUB UR QL STRIP: NEGATIVE
BUN/CREAT SERPL: 17
CALCIUM SERPL-MCNC: 9.7 MG/DL (ref 8.5–10.1)
CHLORIDE SERPL-SCNC: 95 MMOL/L (ref 98–107)
CO2 SERPL-SCNC: 21 MMOL/L (ref 21–32)
CREAT SERPL-MCNC: 1.27 MG/DL (ref 0.6–1.3)
EOSINOPHIL # BLD AUTO: 0.1 10^3/UL (ref 0–0.3)
EOSINOPHIL NFR BLD AUTO: 1 % (ref 0–10)
FIBRINOGEN PPP-MCNC: CLEAR MG/DL
GFR SERPLBLD BASED ON 1.73 SQ M-ARVRAT: 41 ML/MIN
GLUCOSE SERPL-MCNC: 127 MG/DL (ref 70–105)
GLUCOSE UR STRIP-MCNC: NEGATIVE MG/DL
HCT VFR BLD CALC: 36 % (ref 35–52)
HGB BLD-MCNC: 12.1 G/DL (ref 11.5–16)
KETONES UR QL STRIP: NEGATIVE
LEUKOCYTE ESTERASE UR QL STRIP: NEGATIVE
LYMPHOCYTES # BLD AUTO: 1.6 10^3/UL (ref 1–4)
LYMPHOCYTES NFR BLD AUTO: 22 % (ref 12–44)
MAGNESIUM SERPL-MCNC: 1.8 MG/DL (ref 1.6–2.4)
MANUAL DIFFERENTIAL PERFORMED BLD QL: NO
MCH RBC QN AUTO: 30 PG (ref 25–34)
MCHC RBC AUTO-ENTMCNC: 34 G/DL (ref 32–36)
MCV RBC AUTO: 89 FL (ref 80–99)
MONOCYTES # BLD AUTO: 0.6 10^3/UL (ref 0–1)
MONOCYTES NFR BLD AUTO: 9 % (ref 0–12)
NEUTROPHILS # BLD AUTO: 4.7 10^3/UL (ref 1.8–7.8)
NEUTROPHILS NFR BLD AUTO: 67 % (ref 42–75)
NITRITE UR QL STRIP: NEGATIVE
PH UR STRIP: 7 [PH] (ref 5–9)
PLATELET # BLD: 153 10^3/UL (ref 130–400)
PMV BLD AUTO: 9.7 FL (ref 9–12.2)
POTASSIUM SERPL-SCNC: 4.7 MMOL/L (ref 3.6–5)
PROT SERPL-MCNC: 7.1 GM/DL (ref 6.4–8.2)
PROT UR QL STRIP: (no result)
RBC #/AREA URNS HPF: (no result) /HPF
SODIUM SERPL-SCNC: 128 MMOL/L (ref 135–145)
SP GR UR STRIP: 1.01 (ref 1.02–1.02)
SQUAMOUS #/AREA URNS HPF: (no result) /HPF
WBC # BLD AUTO: 6.9 10^3/UL (ref 4.3–11)
WBC #/AREA URNS HPF: (no result) /HPF

## 2021-06-06 PROCEDURE — 81000 URINALYSIS NONAUTO W/SCOPE: CPT

## 2021-06-06 PROCEDURE — 72125 CT NECK SPINE W/O DYE: CPT

## 2021-06-06 PROCEDURE — 74176 CT ABD & PELVIS W/O CONTRAST: CPT

## 2021-06-06 PROCEDURE — 71250 CT THORAX DX C-: CPT

## 2021-06-06 PROCEDURE — 73130 X-RAY EXAM OF HAND: CPT

## 2021-06-06 PROCEDURE — 70486 CT MAXILLOFACIAL W/O DYE: CPT

## 2021-06-06 PROCEDURE — 85025 COMPLETE CBC W/AUTO DIFF WBC: CPT

## 2021-06-06 PROCEDURE — 80053 COMPREHEN METABOLIC PANEL: CPT

## 2021-06-06 PROCEDURE — 70450 CT HEAD/BRAIN W/O DYE: CPT

## 2021-06-06 PROCEDURE — 36415 COLL VENOUS BLD VENIPUNCTURE: CPT

## 2021-06-06 PROCEDURE — 73110 X-RAY EXAM OF WRIST: CPT

## 2021-06-06 PROCEDURE — 83735 ASSAY OF MAGNESIUM: CPT

## 2021-06-06 NOTE — DIAGNOSTIC IMAGING REPORT
PROCEDURE: CT head, face, and cervical spine without contrast.



TECHNIQUE: Multiple contiguous axial images were obtained through

the head, neck, and facial bones without the use of intravenous

contrast. Sagittal and coronal reformations through the cervical

spine and facial bones were also performed. Auto Exposure

Controls were utilized during the CT exam to meet ALARA standards

for radiation dose reduction. 



INDICATION: Trauma, fall



COMPARISON: Imaging from the same date as well as from

06/16/2020.



FINDINGS: There is mild atrophy. No intracranial hemorrhage,

intracranial mass, mass effect, midline shift, herniation,

hydrocephalus, or extra-axial fluid collection. Periventricular

and subcortical white matter hypodensities are present, most

consistent with mild background chronic small vessel white matter

ischemic disease. No definite CT evidence of an acute ischemic

infarction. The orbits are unremarkable. The paranasal sinuses

are clear. The calvarium and extracalvarial soft tissues are

unremarkable.



The paranasal sinuses are clear. Zygomatic arches are intact. No

temporomandibular joint dislocation. No acute facial fracture. No

significant nasal septal deviation. The orbits are unremarkable.

Parapharyngeal fat is symmetric and well-maintained. Muscles of

mastication are unremarkable.



Alignment of the cervical spine is well maintained. Alignment of

the atlantooccipital joint is well maintained. Besides endplate

degenerative changes, vertebral body heights are otherwise

well-maintained. Severe disc space height loss at C6/C7 with

associated anterior osteophytes and disc osteophyte complex. No

acute fracture within the cervical spine. Scattered facet joint

degenerative changes and uncovertebral joint hypertrophy are

present. No severe osseous central canal stenosis. No apical

pneumothorax. Mild scattered vascular calcifications. Paraspinal

soft tissues are otherwise unremarkable.



IMPRESSION: 



No acute intracranial abnormality with mild atrophy and mild

background chronic small vessel white matter ischemic disease.



No acute facial fracture.



No acute osseous abnormality within the cervical spine with mild

to moderate multilevel degenerative changes, greatest at the

C6/C7 level.



Dictated by: 



  Dictated on workstation # [id number]

## 2021-06-06 NOTE — ED FALL/INJURY
General


Chief Complaint:  Trauma-Non Activation


Stated Complaint:  FELL


Nursing Triage Note:  


TO ED PER W/C ACCOMPIED BY DAUGHTER WHO REPORTS THAT PATIENT HAS NEUROPATHY IN 


FEET 





FELL LAST NIGHT LANDING FACE DOWN C/O PAIN IN L HAND,FACE ,NECK ABRASION ACROSS 


NOSE. 





1030 DR TO ROOM C COLLAR PLACED


Source:  patient, family


Exam Limitations:  no limitations





History of Present Illness


Date Seen by Provider:  2021


Time Seen by Provider:  10:30


Initial Comments


This 75-year-old woman presents to the emergency room accompanied by her 

daughter after having a fall with injuries last night.  She has neuropathy and 

difficulty with balance due to loss of sensation in her lower extremities.  She 

fell face forward striking her face, torso, and other anterior surfaces on the 

floor.  There was no loss of consciousness.  She does not seem to have any 

altered mental status.  She complains of pain across her face, neck, left hand, 

lower back and lower abdomen. She also appears to have some mild dementia and 

daughter assist with the history. She is minimally ambulatory today with a 

walker.  She reports frequent urination. C-collar was applied.





Allergies and Home Medications


Allergies


Coded Allergies:  


     NSAIDS (Non-Steroidal Anti-Inflamma (Verified  Allergy, Intermediate, 

3/4/14)


   STATES SHE GETS 'CRAZY'


     Sulfa (Sulfonamide Antibiotics) (Unverified  Allergy, Unknown, 3/4/14)


     ampicillin (Unverified  Allergy, Unknown, 3/4/14)


     prednisone (Unverified  Adverse Reaction, Unknown, 14)





Home Medications


ALPRAZolam 0.25 Mg Tablet, 0.25 MG PO TID, (Reported)


Allopurinol 100 Mg Tablet, 100 MG PO DAILY, (Reported)


Cholecalciferol (Vitamin D3) 25 Mcg Capsule, 25 MCG PO DAILY, (Reported)


Dexlansoprazole 60 Mg Cap.dr.bp, 60 MG PO DAILY, (Reported)


Dicyclomine HCl 20 Mg Tablet, 20 MG PO QID PRN for HEARTBURN, (Reported)


Doxazosin Mesylate 2 Mg Tablet, 2 MG PO HS, (Reported)


Estrogens Conjugated 1.25 Mg Tab, 2 MG PO DAILY, (Reported)


Gabapentin 400 Mg Capsule, 400 MG PO BID, (Reported)


Glucos Sul 2Kcl/MSM/Chond/C/Mn 1 Each Capsule, 1 EACH PO DAILY, (Reported)


Hydrocodone/Acetaminophen 1 Each Tablet, 1 TAB PO Q6H


   Prescribed by: JAIR WRIGHT on 20 1451


Hydrocodone/Acetaminophen 1 Each Tablet, 0.5-1 TAB PO Q6H PRN for PAIN-MODERATE 

(5-7)


   Prescribed by: ANNA BATISTA on 21 1325


Imipramine HCl 50 Mg Tablet, 50 MG PO HS, (Reported)


Levomefolate/B6/B12/Algal Oil 1 Each Capsule, 1 EACH PO BID, (Reported)


Linagliptin 5 Mg Tablet, 5 MG PO DAILY, (Reported)


Losartan Potassium 100 Mg Tablet, 100 MG PO DAILY, (Reported)


Magnesium Oxide 400 Mg Tablet, 400 MG PO BID, (Reported)





Patient Home Medication List


Home Medication List Reviewed:  Yes





Review of Systems


Review of Systems


Constitutional:  no symptoms reported


Eyes:  No Symptoms Reported


Ears, Nose, Mouth, Throat:  see HPI


Respiratory:  no symptoms reported


Cardiovascular:  no symptoms reported


Gastrointestinal:  see HPI


Genitourinary:  frequency


Musculoskeletal:  see HPI


Skin:  see HPI


Psychiatric/Neurological:  See HPI





Past Medical-Social-Family Hx


Past Med/Social Hx:  Reviewed and Corrections made


Patient Social History


Alcohol Use:  Denies Use


Smoking Status:  Never a Smoker


2nd Hand Smoke Exposure:  No


Recent Infectious Disease Expo:  No


Recent Hopitalizations:  No





Immunizations Up To Date


Tetanus Booster (TDap):  Unknown


Date of Influenza Vaccine:  Oct 1, 2019





Seasonal Allergies


Seasonal Allergies:  No





Past Medical History


Surgeries:  Yes


Appendectomy, Gallbladder, Hysterectomy, Nephrectomy


Respiratory:  No


Cardiac:  Yes


High Cholesterol, Hypertension


Neurological:  Yes


Dementia (possible), Neuropathy


Reproductive Disorders:  Yes


GYN History:  Hysterectomy, Menopausal


Genitourinary:  Yes (CHRONIC RENAL INSUFFICIENCY, kidney removed after injury 

from sx)


Gastrointestinal:  Yes (fatty liver, stomach lipoma)


Gastroesophageal Reflux


Musculoskeletal:  Yes


Arthritis, Chronic Back Pain


Endocrine:  Yes


Diabetes, Non-Insulin dep


HEENT:  No


Cancer:  No


Psychosocial:  Yes


Anxiety, Depression


Integumentary:  Yes (mass R hand)


Blood Disorders:  No


Adverse Reaction/Blood Tranf:  No





Family Medical History





Cataract


  03 FATHER


  03 MOTHER


  09 BROTHER


  09 BROTHER


Dementia


  03 MOTHER


Family history: Allergy


  09 BROTHER


Family history: Arthritis


  03 MOTHER


Family history: Diabetes mellitus


  09 BROTHER


Family history: Glaucoma


  03 MOTHER


Family history: Osteoporosis


  03 MOTHER


Hereditary disease


  03 MOTHER


Prostate cancer


  03 FATHER


Stroke


  03 MOTHER





No Family History of:


  Abdominal aortic aneurysm


  Andres's disease


  Alcoholism


  Aphasia


  Cancer


  Cancer of colon


  Chest pain


  Congenital heart disease


  Congestive heart failure


  Cystic fibrosis


  Dysphagia


  Family history: Alzheimer's disease


  Family history: Asthma


  Family history: Breast disease


  Family history: Cardiovascular disease


  Family history: Coronary thrombosis


  Family history: Gastrointestinal disease


  Family history: Hypertension


  Family history: Thyroid disorder


  Headache


  Hearing loss


  Heart disease


  History of - anemia


  History of - disorder


  History of - respiratory disease


  History of drug abuse


  Human immunodeficiency virus (HIV) seropositivity


  Hypercholesterolemia


  Infertile


  Kidney disease


  Malignant neoplasm of lung


  Myocardial infarction


  Parkinson's disease


  Psychotic disorder


  Seizure disorder


  Tuberculosis


  Visual impairment





Physical Exam


Vital Signs





Vital Signs - First Documented








 21





 10:19 14:24


 


Temp 36.6 


 


Pulse 82 


 


Resp 18 


 


B/P (MAP) 206/74 (118) 


 


Pulse Ox 95 


 


O2 Delivery  Room Air





Capillary Refill : Less Than 3 Seconds


Height, Weight, BMI


Height: 5'0.00"


Weight: 158lbs. 0.0oz. 71.814808id; 29.00 BMI


Method:Stated


General Appearance:  WD/WN, mild distress


HEENT:  PERRL/EOMI, normal ENT inspection, pharynx normal, other (abrasion over 

the nose, facial TTP, teeth intacet)


Neck:  normal inspection, limited range of motion, tender midline


Cardiovascular:  regular rate, rhythm, no edema, no murmur


Respiratory:  lungs clear, normal breath sounds, no respiratory distress, no 

accessory muscle use


Gastrointestinal:  normal bowel sounds, soft, tenderness (Lower abdomen)


Back:  other (lower back generalized TTP)


Extremities:  normal inspection, no pedal edema


Neurologic/Psychiatric:  CNs II-XII nml as tested, no motor/sensory deficits, 

alert, normal mood/affect


Skin:  normal color, warm/dry





Progress/Results/Core Measures


Results/Orders


Lab Results





Laboratory Tests








Test


 21


10:50 21


12:40 Range/Units


 


 


White Blood Count


 6.9 


 


 4.3-11.0


10^3/uL


 


Red Blood Count


 4.01 


 


 3.80-5.11


10^6/uL


 


Hemoglobin 12.1   11.5-16.0  g/dL


 


Hematocrit 36   35-52  %


 


Mean Corpuscular Volume 89   80-99  fL


 


Mean Corpuscular Hemoglobin 30   25-34  pg


 


Mean Corpuscular Hemoglobin


Concent 34 


 


 32-36  g/dL





 


Red Cell Distribution Width 12.2   10.0-14.5  %


 


Platelet Count


 153 


 


 130-400


10^3/uL


 


Mean Platelet Volume 9.7   9.0-12.2  fL


 


Immature Granulocyte % (Auto) 0    %


 


Neutrophils (%) (Auto) 67   42-75  %


 


Lymphocytes (%) (Auto) 22   12-44  %


 


Monocytes (%) (Auto) 9   0-12  %


 


Eosinophils (%) (Auto) 1   0-10  %


 


Basophils (%) (Auto) 0   0-10  %


 


Neutrophils # (Auto)


 4.7 


 


 1.8-7.8


10^3/uL


 


Lymphocytes # (Auto)


 1.6 


 


 1.0-4.0


10^3/uL


 


Monocytes # (Auto)


 0.6 


 


 0.0-1.0


10^3/uL


 


Eosinophils # (Auto)


 0.1 


 


 0.0-0.3


10^3/uL


 


Basophils # (Auto)


 0.0 


 


 0.0-0.1


10^3/uL


 


Immature Granulocyte # (Auto)


 0.0 


 


 0.0-0.1


10^3/uL


 


Sodium Level 128 L  135-145  MMOL/L


 


Potassium Level 4.7   3.6-5.0  MMOL/L


 


Chloride Level 95 L    MMOL/L


 


Carbon Dioxide Level 21   21-32  MMOL/L


 


Anion Gap 12   5-14  MMOL/L


 


Blood Urea Nitrogen 21 H  7-18  MG/DL


 


Creatinine


 1.27 


 


 0.60-1.30


MG/DL


 


Estimat Glomerular Filtration


Rate 41 


 


  





 


BUN/Creatinine Ratio 17    


 


Glucose Level 127 H    MG/DL


 


Calcium Level 9.7   8.5-10.1  MG/DL


 


Corrected Calcium 9.9   8.5-10.1  MG/DL


 


Magnesium Level 1.8   1.6-2.4  MG/DL


 


Total Bilirubin 0.5   0.1-1.0  MG/DL


 


Aspartate Amino Transf


(AST/SGOT) 21 


 


 5-34  U/L





 


Alanine Aminotransferase


(ALT/SGPT) 19 


 


 0-55  U/L





 


Alkaline Phosphatase 64     U/L


 


Total Protein 7.1   6.4-8.2  GM/DL


 


Albumin 3.7   3.2-4.5  GM/DL


 


Urine Color  YELLOW   


 


Urine Clarity  CLEAR   


 


Urine pH  7.0  5-9  


 


Urine Specific Gravity  1.010 L 1.016-1.022  


 


Urine Protein  1+ H NEGATIVE  


 


Urine Glucose (UA)  NEGATIVE  NEGATIVE  


 


Urine Ketones  NEGATIVE  NEGATIVE  


 


Urine Nitrite  NEGATIVE  NEGATIVE  


 


Urine Bilirubin  NEGATIVE  NEGATIVE  


 


Urine Urobilinogen  0.2  < = 1.0  MG/DL


 


Urine Leukocyte Esterase  NEGATIVE  NEGATIVE  


 


Urine RBC (Auto)  NEGATIVE  NEGATIVE  


 


Urine RBC  NONE   /HPF


 


Urine WBC  NONE   /HPF


 


Urine Squamous Epithelial


Cells 


 0-2 


  /HPF





 


Urine Crystals  NONE   /LPF


 


Urine Bacteria  TRACE   /HPF


 


Urine Casts  NONE   /LPF


 


Urine Mucus  NEGATIVE   /LPF


 


Urine Culture Indicated  NO   








My Orders





Orders - ANNA ROLON MD


Ed Iv/Invasive Line Start (21 10:35)


Fentanyl  Inj (Sublimaze Injection) (21 10:45)


Ct Head/Face/Cervical Wo (21 10:36)


Cbc With Automated Diff (21 10:38)


Comprehensive Metabolic Panel (21 10:38)


Magnesium (21 10:38)


Ua Culture If Indicated (21 10:38)


Wrist, Left, 3 Views Or More (21 10:40)


Hand, Left, 3 Views (21 10:40)


Ct Chest/Abdomen/Pelvis Wo (21 10:55)


Ns Iv 500 Ml (Sodium Chloride 0.9%) (21 12:30)


Hydrocodone/Apap 5/325 Tablet (Lortab 5 (21 12:45)


Alprazolam Tablet (Xanax Tablet) (21 12:45)





Medications Given in ED





Vital Signs/I&O











 21





 10:19 14:24


 


Temp 36.6 


 


Pulse 82 73


 


Resp 18 18


 


B/P (MAP) 206/74 (118) 189/64


 


Pulse Ox 95 98


 


O2 Delivery  Room Air














Blood Pressure Mean:                    118











Progress


Progress Note :  


Progress Note


Pain was treated with fentanyl and later hydrocodone.  CT from head through 

pelvis revealed no acute injuries.  Hyponatremia was identified and was treated 

with a normal saline 500 mL bolus.  Left hand was x-rayed and no injury was 

identified.  See discharge instructions for further discussion.  Patient does 

report poor appetite and not eating much which may be a source of her 

hyponatremia.  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 

her appetite.  She is to discuss this further with her primary care provider, 

and we reviewed ways to provide nutritional support.





Diagnostic Imaging





   Diagonstic Imaging:  CT


   Plain Films/CT/US/NM/MRI:  facial bones, c-spine, head


Comments


CT viewed by me and report reviewed.





NAME:   REED PAZ


[facility] REC#:   [id number]


ACCOUNT#:   [id number]


PT STATUS:   DEP ER


:   1945


PHYSICIAN:   ANNA ROLON MD


ADMIT DATE:   21/ER


***Signed***


Date of Exam:21





CT HEAD/FACE/CERVICAL WO








PROCEDURE: CT head, face, and cervical spine without contrast.





TECHNIQUE: Multiple contiguous axial images were obtained through


the head, neck, and facial bones without the use of intravenous


contrast. Sagittal and coronal reformations through the cervical


spine and facial bones were also performed. Auto Exposure


Controls were utilized during the CT exam to meet ALARA standards


for radiation dose reduction. 





INDICATION: Trauma, fall





COMPARISON: Imaging from the same date as well as from


2020.





FINDINGS: There is mild atrophy. No intracranial hemorrhage,


intracranial mass, mass effect, midline shift, herniation,


hydrocephalus, or extra-axial fluid collection. Periventricular


and subcortical white matter hypodensities are present, most


consistent with mild background chronic small vessel white matter


ischemic disease. No definite CT evidence of an acute ischemic


infarction. The orbits are unremarkable. The paranasal sinuses


are clear. The calvarium and extracalvarial soft tissues are


unremarkable.





The paranasal sinuses are clear. Zygomatic arches are intact. No


temporomandibular joint dislocation. No acute facial fracture. No


significant nasal septal deviation. The orbits are unremarkable.


Parapharyngeal fat is symmetric and well-maintained. Muscles of


mastication are unremarkable.





Alignment of the cervical spine is well maintained. Alignment of


the atlantooccipital joint is well maintained. Besides endplate


degenerative changes, vertebral body heights are otherwise


well-maintained. Severe disc space height loss at C6/C7 with


associated anterior osteophytes and disc osteophyte complex. No


acute fracture within the cervical spine. Scattered facet joint


degenerative changes and uncovertebral joint hypertrophy are


present. No severe osseous central canal stenosis. No apical


pneumothorax. Mild scattered vascular calcifications. Paraspinal


soft tissues are otherwise unremarkable.





IMPRESSION: 





No acute intracranial abnormality with mild atrophy and mild


background chronic small vessel white matter ischemic disease.





No acute facial fracture.





No acute osseous abnormality within the cervical spine with mild


to moderate multilevel degenerative changes, greatest at the


C6/C7 level.





Dictated by: 





  Dictated on workstation # [id number]








Dict:   21 1122


Trans:   21


[facility] [id number]





Interpreted by:     ISH PAZ MD


Electronically signed by: ISH PAZ MD 21








   Diagonstic Imaging:  Xray


   Plain Films/CT/US/NM/MRI:  hand (and wrist)


Comments


X-rays of the left hand and wrist viewed by me and report reviewed.  No acute 

injuries were identified.








   Diagonstic Imaging:  CT


   Plain Films/CT/US/NM/MRI:  chest, abdomen, pelvis


Comments


Viewed by me and reports reviewed.





NAME:   REED PAZ


[facility] REC#:   [id number]


ACCOUNT#:   [id number]


PT STATUS:   DEP ER


:   1945


PHYSICIAN:   ANNA ROLON MD


ADMIT DATE:   21/ER


***Signed***


Date of Exam:21





CT CHEST/ABDOMEN/PELVIS WO





PROCEDURE: CT chest, abdomen, and pelvis without contrast.





TECHNIQUE: Multiple contiguous axial images were obtained through


the chest, abdomen, and pelvis without the use of intravenous


contrast. Auto Exposure Controls were utilized during the CT exam


to meet ALARA standards for radiation dose reduction. 





INDICATION:  Fall, abdominal pain.





COMPARISON: Imaging from the same date as well as 2014





FINDINGS: No significant adenopathy within the chest. Mild


scattered vascular calcifications without aneurysmal dilatation


of the thoracic aorta. The heart is within normal limits in size.


No pericardial effusion. No pleural effusion. No pneumothorax.


Mild bibasilar scarring and/or atelectasis, left greater than


right. The trachea is patent. No acute osseous abnormality within


the chest.





Cholecystectomy. Prominent lipoma is noted within the distal


stomach, measuring up to 5.5 cm. This has slightly increased


since the prior examination in . This does appear to have


mass effect upon the gastric outlet. The unenhanced liver and


spleen are unremarkable. The adrenal glands are unremarkable. The


pancreas is unremarkable. The right kidney is unremarkable. The


left kidney is not visualized, stable from the prior exam. No


abnormal soft tissue mass within the left renal fossa. Scattered


vascular calcifications without aneurysmal dilatation of the


abdominal aorta. The urinary bladder is unremarkable. The uterus


is not visualized, likely surgically absent. No bowel obstruction


or pneumatosis. No significant adenopathy, free air, or free


fluid within the abdomen or pelvis. Scattered osseous


degenerative changes without acute osseous abnormality.





IMPRESSION: 





No acute traumatic abnormality identified.





Lipoma within the distal stomach with mass effect upon the


gastric outlet. This has increased in size since .





Additional postsurgical and chronic findings as described above.





Dictated by: 





  Dictated on workstation # [id number]





Dict:   21 1132


Trans:   21


[facility] [id number]





Interpreted by:     ISH PAZ MD


Electronically signed by: ISH PAZ MD 21





Departure


Impression





   Primary Impression:  


   Fall on same level


   Qualified Codes:  W18.30XA - Fall on same level, unspecified, initial 

   encounter


   Additional Impressions:  


   Hyponatremia


   Facial contusion


   Qualified Codes:  S00.83XA - Contusion of other part of head, initial 

   encounter


   Low back pain


   Qualified Codes:  M54.5 - Low back pain


   Left hand pain


Disposition:  01 HOME, SELF-CARE


Condition:  Improved





Departure-Patient Inst.


Decision time for Depature:  13:21


Referrals:  


KAYLEY MURRAY DO (PCP/Family)


Primary Care Physician


Patient Instructions:  Hyponatremia (DC)





Add. Discharge Instructions:  


Follow-up with your primary care provider by middle of this week.  You need to 

have your labs checked again in the very near future to monitor your sodium 

level.  Try to eat a well-balanced diet.  For the next couple of days you may 

lightly salt your food to improve your sodium level.  Drink plenty of clear 

liquids to stay well-hydrated.  For pain you may take Tylenol as usual.  For 

more intense pain you may use hydrocodone as prescribed.  Use hydrocodone with 

caution especially when taking other sedating medications like Xanax.  Be aware 

that hydrocodone also has some Tylenol with it.  According to your instructions 

from your specialist you should not exceed 2000 mg of Tylenol (acetaminophen) in

1 day.


Walk only with your walker.


Follow-up with your urologist or your primary care provider regarding urinary 

frequency.


Call with questions or concerns.


Return to care if you have worsening symptoms.





All discharge instructions reviewed with patient and/or family. Voiced 

understanding.


Scripts


Hydrocodone/Acetaminophen (Hydrocodone-Acetamin 5-325 mg) 1 Each Tablet


0.5-1 TAB PO Q6H PRN for PAIN-MODERATE (5-7), #5 TAB


   Prov: ANNA ROLON MD         21





Copy


Copies To 1:   KAYLEY MURRAY JOSHUA T MD         2021 13:25

## 2021-06-06 NOTE — DIAGNOSTIC IMAGING REPORT
INDICATION:  hand pain



TECHNIQUE:  Three views of the left hand.



CORRELATION STUDY:  None



FINDINGS: 

There is normal alignment and appearance of the osseous

structures of the hand. The joint spaces are maintained. There is

no acute fracture.   Soft tissues are unremarkable.



IMPRESSION: 

1. Negative for acute bony abnormality of the hand.



Dictated by: 



  Dictated on workstation # [id number]

## 2021-06-06 NOTE — DIAGNOSTIC IMAGING REPORT
INDICATION:  Wrist pain



TECHNIQUE:  3 views of the left wrist  



CORRELATION STUDY:  None



FINDINGS: The osseous structures of the wrist have an

unremarkable appearance. Alignment is anatomic. There is no acute

bony abnormality.  Mild soft tissue edema particularly along the

dorsal aspect of the wrist.



IMPRESSION:  

1.  Negative examination of the wrist.



Dictated by: 



  Dictated on workstation # [id number]

## 2021-06-06 NOTE — DIAGNOSTIC IMAGING REPORT
PROCEDURE: CT chest, abdomen, and pelvis without contrast.



TECHNIQUE: Multiple contiguous axial images were obtained through

the chest, abdomen, and pelvis without the use of intravenous

contrast. Auto Exposure Controls were utilized during the CT exam

to meet ALARA standards for radiation dose reduction. 



INDICATION:  Fall, abdominal pain.



COMPARISON: Imaging from the same date as well as 09/22/2014



FINDINGS: No significant adenopathy within the chest. Mild

scattered vascular calcifications without aneurysmal dilatation

of the thoracic aorta. The heart is within normal limits in size.

No pericardial effusion. No pleural effusion. No pneumothorax.

Mild bibasilar scarring and/or atelectasis, left greater than

right. The trachea is patent. No acute osseous abnormality within

the chest.



Cholecystectomy. Prominent lipoma is noted within the distal

stomach, measuring up to 5.5 cm. This has slightly increased

since the prior examination in 2014. This does appear to have

mass effect upon the gastric outlet. The unenhanced liver and

spleen are unremarkable. The adrenal glands are unremarkable. The

pancreas is unremarkable. The right kidney is unremarkable. The

left kidney is not visualized, stable from the prior exam. No

abnormal soft tissue mass within the left renal fossa. Scattered

vascular calcifications without aneurysmal dilatation of the

abdominal aorta. The urinary bladder is unremarkable. The uterus

is not visualized, likely surgically absent. No bowel obstruction

or pneumatosis. No significant adenopathy, free air, or free

fluid within the abdomen or pelvis. Scattered osseous

degenerative changes without acute osseous abnormality.



IMPRESSION: 



No acute traumatic abnormality identified.



Lipoma within the distal stomach with mass effect upon the

gastric outlet. This has increased in size since 2014.



Additional postsurgical and chronic findings as described above.



Dictated by: 



  Dictated on workstation # [id number]

## 2021-08-03 ENCOUNTER — HOSPITAL ENCOUNTER (OUTPATIENT)
Dept: HOSPITAL 75 - ER | Age: 76
Setting detail: OBSERVATION
LOS: 6 days | Discharge: HOME | End: 2021-08-09
Attending: FAMILY MEDICINE | Admitting: FAMILY MEDICINE
Payer: MEDICARE

## 2021-08-03 VITALS — DIASTOLIC BLOOD PRESSURE: 97 MMHG | SYSTOLIC BLOOD PRESSURE: 132 MMHG

## 2021-08-03 VITALS — DIASTOLIC BLOOD PRESSURE: 64 MMHG | SYSTOLIC BLOOD PRESSURE: 140 MMHG

## 2021-08-03 VITALS — WEIGHT: 173.06 LBS | BODY MASS INDEX: 33.98 KG/M2 | HEIGHT: 60 IN

## 2021-08-03 DIAGNOSIS — Z82.3: ICD-10-CM

## 2021-08-03 DIAGNOSIS — K64.8: Primary | ICD-10-CM

## 2021-08-03 DIAGNOSIS — K59.09: ICD-10-CM

## 2021-08-03 DIAGNOSIS — I38: ICD-10-CM

## 2021-08-03 DIAGNOSIS — K64.4: ICD-10-CM

## 2021-08-03 DIAGNOSIS — J90: ICD-10-CM

## 2021-08-03 DIAGNOSIS — Z80.42: ICD-10-CM

## 2021-08-03 DIAGNOSIS — D62: ICD-10-CM

## 2021-08-03 DIAGNOSIS — E87.1: ICD-10-CM

## 2021-08-03 DIAGNOSIS — Z79.899: ICD-10-CM

## 2021-08-03 DIAGNOSIS — K21.9: ICD-10-CM

## 2021-08-03 DIAGNOSIS — N18.9: ICD-10-CM

## 2021-08-03 DIAGNOSIS — E86.0: ICD-10-CM

## 2021-08-03 DIAGNOSIS — M19.90: ICD-10-CM

## 2021-08-03 DIAGNOSIS — Z90.5: ICD-10-CM

## 2021-08-03 DIAGNOSIS — E11.42: ICD-10-CM

## 2021-08-03 DIAGNOSIS — F31.9: ICD-10-CM

## 2021-08-03 DIAGNOSIS — Z90.710: ICD-10-CM

## 2021-08-03 DIAGNOSIS — E83.51: ICD-10-CM

## 2021-08-03 DIAGNOSIS — Z90.89: ICD-10-CM

## 2021-08-03 DIAGNOSIS — K62.5: ICD-10-CM

## 2021-08-03 DIAGNOSIS — E66.9: ICD-10-CM

## 2021-08-03 DIAGNOSIS — E11.22: ICD-10-CM

## 2021-08-03 DIAGNOSIS — I13.10: ICD-10-CM

## 2021-08-03 LAB
ALBUMIN SERPL-MCNC: 3.8 GM/DL (ref 3.2–4.5)
ALP SERPL-CCNC: 62 U/L (ref 40–136)
ALT SERPL-CCNC: 17 U/L (ref 0–55)
BASOPHILS # BLD AUTO: 0 10^3/UL (ref 0–0.1)
BASOPHILS NFR BLD AUTO: 0 % (ref 0–10)
BILIRUB SERPL-MCNC: 0.4 MG/DL (ref 0.1–1)
BUN/CREAT SERPL: 14
CALCIUM SERPL-MCNC: 9.8 MG/DL (ref 8.5–10.1)
CHLORIDE SERPL-SCNC: 91 MMOL/L (ref 98–107)
CO2 SERPL-SCNC: 22 MMOL/L (ref 21–32)
CREAT SERPL-MCNC: 1.27 MG/DL (ref 0.6–1.3)
EOSINOPHIL # BLD AUTO: 0.1 10^3/UL (ref 0–0.3)
EOSINOPHIL NFR BLD AUTO: 1 % (ref 0–10)
GFR SERPLBLD BASED ON 1.73 SQ M-ARVRAT: 41 ML/MIN
GLUCOSE SERPL-MCNC: 130 MG/DL (ref 70–105)
HCT VFR BLD CALC: 36 % (ref 35–52)
HGB BLD-MCNC: 12 G/DL (ref 11.5–16)
INR PPP: 1 (ref 0.8–1.4)
LYMPHOCYTES # BLD AUTO: 1.4 10^3/UL (ref 1–4)
LYMPHOCYTES NFR BLD AUTO: 20 % (ref 12–44)
MANUAL DIFFERENTIAL PERFORMED BLD QL: NO
MCH RBC QN AUTO: 30 PG (ref 25–34)
MCHC RBC AUTO-ENTMCNC: 33 G/DL (ref 32–36)
MCV RBC AUTO: 88 FL (ref 80–99)
MONOCYTES # BLD AUTO: 0.5 10^3/UL (ref 0–1)
MONOCYTES NFR BLD AUTO: 7 % (ref 0–12)
NEUTROPHILS # BLD AUTO: 4.8 10^3/UL (ref 1.8–7.8)
NEUTROPHILS NFR BLD AUTO: 71 % (ref 42–75)
PLATELET # BLD: 191 10^3/UL (ref 130–400)
PMV BLD AUTO: 10 FL (ref 9–12.2)
POTASSIUM SERPL-SCNC: 4.6 MMOL/L (ref 3.6–5)
PROT SERPL-MCNC: 7.1 GM/DL (ref 6.4–8.2)
PROTHROMBIN TIME: 13.1 SEC (ref 12.2–14.7)
SODIUM SERPL-SCNC: 125 MMOL/L (ref 135–145)
WBC # BLD AUTO: 6.8 10^3/UL (ref 4.3–11)

## 2021-08-03 PROCEDURE — 87081 CULTURE SCREEN ONLY: CPT

## 2021-08-03 PROCEDURE — 85007 BL SMEAR W/DIFF WBC COUNT: CPT

## 2021-08-03 PROCEDURE — 85027 COMPLETE CBC AUTOMATED: CPT

## 2021-08-03 PROCEDURE — 85025 COMPLETE CBC W/AUTO DIFF WBC: CPT

## 2021-08-03 PROCEDURE — 82728 ASSAY OF FERRITIN: CPT

## 2021-08-03 PROCEDURE — 85018 HEMOGLOBIN: CPT

## 2021-08-03 PROCEDURE — 36410 VNPNXR 3YR/> PHY/QHP DX/THER: CPT

## 2021-08-03 PROCEDURE — 46930 DESTROY INTERNAL HEMORRHOIDS: CPT

## 2021-08-03 PROCEDURE — 87636 SARSCOV2 & INF A&B AMP PRB: CPT

## 2021-08-03 PROCEDURE — 82947 ASSAY GLUCOSE BLOOD QUANT: CPT

## 2021-08-03 PROCEDURE — 83540 ASSAY OF IRON: CPT

## 2021-08-03 PROCEDURE — 76937 US GUIDE VASCULAR ACCESS: CPT

## 2021-08-03 PROCEDURE — 71046 X-RAY EXAM CHEST 2 VIEWS: CPT

## 2021-08-03 PROCEDURE — 86920 COMPATIBILITY TEST SPIN: CPT

## 2021-08-03 PROCEDURE — 86850 RBC ANTIBODY SCREEN: CPT

## 2021-08-03 PROCEDURE — 87186 SC STD MICRODIL/AGAR DIL: CPT

## 2021-08-03 PROCEDURE — 88304 TISSUE EXAM BY PATHOLOGIST: CPT

## 2021-08-03 PROCEDURE — 86900 BLOOD TYPING SEROLOGIC ABO: CPT

## 2021-08-03 PROCEDURE — 36415 COLL VENOUS BLD VENIPUNCTURE: CPT

## 2021-08-03 PROCEDURE — 87077 CULTURE AEROBIC IDENTIFY: CPT

## 2021-08-03 PROCEDURE — 46999 UNLISTED PROCEDURE ANUS: CPT

## 2021-08-03 PROCEDURE — 85610 PROTHROMBIN TIME: CPT

## 2021-08-03 PROCEDURE — 80053 COMPREHEN METABOLIC PANEL: CPT

## 2021-08-03 PROCEDURE — 94664 DEMO&/EVAL PT USE INHALER: CPT

## 2021-08-03 PROCEDURE — 83550 IRON BINDING TEST: CPT

## 2021-08-03 PROCEDURE — 83735 ASSAY OF MAGNESIUM: CPT

## 2021-08-03 PROCEDURE — 85014 HEMATOCRIT: CPT

## 2021-08-03 PROCEDURE — 87088 URINE BACTERIA CULTURE: CPT

## 2021-08-03 PROCEDURE — 80048 BASIC METABOLIC PNL TOTAL CA: CPT

## 2021-08-03 PROCEDURE — 99283 EMERGENCY DEPT VISIT LOW MDM: CPT

## 2021-08-03 PROCEDURE — 86901 BLOOD TYPING SEROLOGIC RH(D): CPT

## 2021-08-03 PROCEDURE — 81000 URINALYSIS NONAUTO W/SCOPE: CPT

## 2021-08-03 RX ADMIN — INSULIN ASPART SCH UNIT: 100 INJECTION, SOLUTION INTRAVENOUS; SUBCUTANEOUS at 20:54

## 2021-08-03 RX ADMIN — PRAMOXINE HYDROCHLORIDE HYDROCORTISONE ACETATE SCH GM: 100; 100 AEROSOL, FOAM TOPICAL at 20:42

## 2021-08-03 RX ADMIN — Medication SCH ML: at 22:39

## 2021-08-03 RX ADMIN — IMIPRAMINE HYDROCHLORIDE SCH MG: 25 TABLET ORAL at 20:42

## 2021-08-03 RX ADMIN — ACETAMINOPHEN PRN MG: 325 TABLET ORAL at 23:53

## 2021-08-03 RX ADMIN — SODIUM CHLORIDE SCH MLS/HR: 900 INJECTION, SOLUTION INTRAVENOUS at 20:43

## 2021-08-03 RX ADMIN — GABAPENTIN SCH MG: 400 CAPSULE ORAL at 20:43

## 2021-08-03 RX ADMIN — ALPRAZOLAM PRN MG: 0.25 TABLET ORAL at 18:02

## 2021-08-03 RX ADMIN — DOXAZOSIN MESYLATE SCH MG: 2 TABLET ORAL at 20:43

## 2021-08-03 NOTE — CONSULTATION - SURGERY
JAIME COREY 8/3/21 1509:


History of Present Illness


History of Present Illness


Patient Consulted On(dileep/time)


8/3/21


 14:50


Date Seen by Provider:  Aug 3, 2021


Time Seen by Provider:  14:50


Reason for Visit:  Bleeding internal and external hemorrhoids


History of Present Illness


Patient states she has had the hemorrhoids for a long time. They are not really 

painful unless she slides across a malave to get out or sits for a long period of

time. She started bleeding on sunday at 0030 and bled all through monday until 

the evening. She went to the bathroom monday night and stated there was no 

blood. When she went to the bathroom on tuesday morning at 0530 she noticed 

blood. She states normally using pressure will help stop the bleeding, but there

was no stopping the flow that started on tuesday morning. She has used a foam 

medication in the past that does help, she cannot recall the name. She states 

cream does not work. Patient states she has always had bowel problems and really

has to bear down to pass stool





Allergies and Home Medications


Allergies


Coded Allergies:  


     NSAIDS (Non-Steroidal Anti-Inflamma (Verified  Allergy, Intermediate, 

3/4/14)


   STATES SHE GETS 'CRAZY'


     Sulfa (Sulfonamide Antibiotics) (Unverified  Allergy, Unknown, 3/4/14)


     ampicillin (Unverified  Allergy, Unknown, 3/4/14)


     prednisone (Unverified  Adverse Reaction, Unknown, 5/1/14)





Home Medications


ALPRAZolam 0.25 Mg Tablet, 0.25 MG PO TID PRN for ANXIETY, (Reported)


   Last Action: Continued


Allopurinol 100 Mg Tablet, 100 MG PO DAILY, (Reported)


   Last Action: Reviewed


Cholecalciferol (Vitamin D3) 25 Mcg Capsule, 25 MCG PO DAILY, (Reported)


   Last Action: Reviewed


Dexlansoprazole 60 Mg Cap.dr.bp, 60 MG PO DAILY, (Reported)


   Last Action: Reviewed


Dicyclomine HCl 20 Mg Tablet, 20 MG PO QID PRN for ABDOMINAL CRAMPING, 

(Reported)


   Last Action: Reviewed


Doxazosin Mesylate 2 Mg Tablet, 2 MG PO HS, (Reported)


   Last Action: Reviewed


Estrogens Conjugated 1.25 Mg Tab, 1.25 MG PO DAILY, (Reported)


   Last Action: Reviewed


Gabapentin 400 Mg Capsule, 400 MG PO BID, (Reported)


   Last Action: Reviewed


Glucos Sul 2Kcl/MSM/Chond/C/Mn 1 Each Capsule, 1 EACH PO DAILY, (Reported)


   Last Action: Reviewed


Imipramine HCl 50 Mg Tablet, 50 MG PO HS, (Reported)


   Last Action: Reviewed


Levomefolate/B6/B12/Algal Oil 1 Each Capsule, 1 EACH PO BID, (Reported)


   Last Action: Reviewed


Linagliptin 5 Mg Tablet, 5 MG PO DAILY, (Reported)


   Last Action: Reviewed


Losartan Potassium 100 Mg Tablet, 100 MG PO DAILY, (Reported)


   Last Action: Reviewed


Magnesium Oxide 400 Mg Tablet, 400 MG PO DAILY PRN for MUSCLE CRAMPS, (Reported)


   HOLD FOR LOOSE STOOLS 


   Last Action: Reviewed





Past Medical-Social-Family Hx


Patient Social History


Smoking Status:  Never a Smoker


2nd Hand Smoke Exposure:  No


Recent Hopitalizations:  No


Alcohol Use?:  No


Have you traveled recently?:  No





Immunizations Up To Date


Tetanus Booster (TDap):  Unknown


Date of Influenza Vaccine:  Oct 1, 2019





Seasonal Allergies


Seasonal Allergies:  No





Surgeries


History of Surgeries:  Yes


Surgeries:  Appendectomy, Gallbladder, Hysterectomy, Nephrectomy





Respiratory


History of Respiratory Disorde:  No





Cardiovascular


History of Cardiac Disorders:  Yes


Cardiac Disorders:  High Cholesterol, Hypertension





Neurological


History of Neurological Disord:  Yes


Neurological Disorders:  Dementia, Neuropathy





Reproductive System


Hx Reproductive Disorders:  Yes


GYN History:  Hysterectomy, Menopausal





Genitourinary


History of Genitourinary Disor:  Yes (CHRONIC RENAL INSUFFICIENCY, kidney 

removed after injury from sx)





Gastrointestinal


History of Gastrointestinal Di:  Yes (fatty liver, stomach lipoma)


Gastrointestinal Disorders:  Gastroesophageal Reflux





Musculoskeletal


History of Musculoskeletal Dis:  Yes


Musculoskeletal Disorders:  Arthritis, Chronic Back Pain





Endocrine


History of Endocrine Disorders:  Yes


Endocrine Disorders:  Diabetes, Non-Insulin dep





HEENT


History of HEENT Disorders:  No





Cancer


History of Cancer:  No





Psychosocial


History of Psychiatric Problem:  Yes


Behavioral Health Disorders:  Anxiety, Depression





Integumentary


History of Skin or Integumenta:  Yes (mass R hand)





Blood Transfusions


History of Blood Disorders:  No


Adverse Reaction to a Blood Tr:  No





Family Medical History


Family Medial History:  


Cataract


  03 FATHER


  03 MOTHER


  09 BROTHER


  09 BROTHER


Dementia


  03 MOTHER


Family history: Allergy


  09 BROTHER


Family history: Arthritis


  03 MOTHER


Family history: Diabetes mellitus


  09 BROTHER


Family history: Glaucoma


  03 MOTHER


Family history: Osteoporosis


  03 MOTHER


Hereditary disease


  03 MOTHER


Prostate cancer


  03 FATHER


Stroke


  03 MOTHER





No Family History of:


  Abdominal aortic aneurysm


  [place]'s disease


  Alcoholism


  Aphasia


  Cancer


  Cancer of colon


  Chest pain


  Congenital heart disease


  Congestive heart failure


  Cystic fibrosis


  Dysphagia


  Family history: Alzheimer's disease


  Family history: Asthma


  Family history: Breast disease


  Family history: Cardiovascular disease


  Family history: Coronary thrombosis


  Family history: Gastrointestinal disease


  Family history: Hypertension


  Family history: Thyroid disorder


  Headache


  Hearing loss


  Heart disease


  History of - anemia


  History of - disorder


  History of - respiratory disease


  History of drug abuse


  Human immunodeficiency virus (HIV) seropositivity


  Hypercholesterolemia


  Infertile


  Kidney disease


  Malignant neoplasm of lung


  Myocardial infarction


  Parkinson's disease


  Psychotic disorder


  Seizure disorder


  Tuberculosis


  Visual impairment





Review of Systems-General


Constitutional:  No chills, No dizziness; weakness (had a little weakness this 

morning when couldn't get blood stopped)


EENTM:  No blurred vision, No vision loss


Respiratory:  cough (pt states has a minor cough sometimes); No short of breath


Cardiovascular:  No chest pain, No palpitations


Gastrointestinal:  No abdominal pain; constipation (patient feels like she has 

it all of the time unless she takes magnesium and stool softeners.. she can't 

take them when she travels); No nausea, No vomiting





Physical Exam-General Problems


Physical Exam


Vital Signs





Vital Signs - First Documented








 8/3/21





 12:35


 


Temp 36.0


 


Pulse 80


 


Resp 18


 


B/P (MAP) 168/57 (94)


 


Pulse Ox 97


 


O2 Delivery Room Air





Capillary Refill : Less Than 3 Seconds


Respiratory:  chest non-tender, lungs clear, normal breath sounds, no 

respiratory distress, no accessory muscle use


Cardiovascular:  regular rate, rhythm; No tachycardia


Gastrointestinal:  normal bowel sounds, non tender, soft





Data Review


Labs


Laboratory Tests


8/3/21 12:45: 


White Blood Count 6.8, Red Blood Count 4.06, Hemoglobin 12.0, Hematocrit 36, Me

an Corpuscular Volume 88, Mean Corpuscular Hemoglobin 30, Mean Corpuscular 

Hemoglobin Concent 33, Red Cell Distribution Width 11.9, Platelet Count 191, 

Mean Platelet Volume 10.0, Immature Granulocyte % (Auto) 0, Neutrophils (%) 

(Auto) 71, Lymphocytes (%) (Auto) 20, Monocytes (%) (Auto) 7, Eosinophils (%) 

(Auto) 1, Basophils (%) (Auto) 0, Neutrophils # (Auto) 4.8, Lymphocytes # (Auto)

1.4, Monocytes # (Auto) 0.5, Eosinophils # (Auto) 0.1, Basophils # (Auto) 0.0, 

Immature Granulocyte # (Auto) 0.0, Prothrombin Time 13.1, INR Comment 1.0, 

Sodium Level 125*L, Potassium Level 4.6, Chloride Level 91L, Carbon Dioxide 

Level 22, Anion Gap 12, Blood Urea Nitrogen 18, Creatinine 1.27, Estimat 

Glomerular Filtration Rate 41, BUN/Creatinine Ratio 14, Glucose Level 130H, 

Calcium Level 9.8, Corrected Calcium 10.0, Total Bilirubin 0.4, Aspartate Amino 

Transf (AST/SGOT) 24, Alanine Aminotransferase (ALT/SGPT) 17, Alkaline 

Phosphatase 62, Total Protein 7.1, Albumin 3.8





Assessment/Plan


1. Internal  hemorrhoid bleeding


2. external hemorrhoid bleeding








plan: 


Monitor blood loss and labs 


Discuss noninvasive management options 


Consider discussing other options for patient





RAFAEL MENDEZ DO 8/3/21 1750:


History of Present Illness


History of Present Illness


Time Seen by Provider:  17:22


History of Present Illness


Surgery asked to consult regarding rectal bleed.





Pt stated she is still bleeding today, tried to hold pressure this am "that 

usually stops it, but it didn't this time".  Pt states she just had a recent EGD

and colonoscopy up at , doesn't think they found anything except hemorrhoids. 

States she had a colonoscopy 2 or 3 years ago and 


afterward had extensive bleeding "they did something to stop it and then I had 

no bleeding for 2 years".  Pt denies pain in that area, but it is uncomfortable 

to sit.





Allergies and Home Medications


Allergies


Coded Allergies:  


     NSAIDS (Non-Steroidal Anti-Inflamma (Verified  Allergy, Intermediate, 

3/4/14)


   STATES SHE GETS 'CRAZY'


     Sulfa (Sulfonamide Antibiotics) (Unverified  Allergy, Unknown, 3/4/14)


     ampicillin (Unverified  Allergy, Unknown, 3/4/14)


     prednisone (Unverified  Adverse Reaction, Unknown, 5/1/14)





Home Medications


ALPRAZolam 0.25 Mg Tablet, 0.25 MG PO TID PRN for ANXIETY, (Reported)


   Last Action: Continued


Allopurinol 100 Mg Tablet, 100 MG PO DAILY, (Reported)


   Last Action: Reviewed


Cholecalciferol (Vitamin D3) 25 Mcg Capsule, 25 MCG PO DAILY, (Reported)


   Last Action: Reviewed


Dexlansoprazole 60 Mg Cap.dr.bp, 60 MG PO DAILY, (Reported)


   Last Action: Reviewed


Dicyclomine HCl 20 Mg Tablet, 20 MG PO QID PRN for ABDOMINAL CRAMPING, 

(Reported)


   Last Action: Reviewed


Doxazosin Mesylate 2 Mg Tablet, 2 MG PO HS, (Reported)


   Last Action: Reviewed


Estrogens Conjugated 1.25 Mg Tab, 1.25 MG PO DAILY, (Reported)


   Last Action: Reviewed


Gabapentin 400 Mg Capsule, 400 MG PO BID, (Reported)


   Last Action: Reviewed


Glucos Sul 2Kcl/MSM/Chond/C/Mn 1 Each Capsule, 1 EACH PO DAILY, (Reported)


   Last Action: Reviewed


Imipramine HCl 50 Mg Tablet, 50 MG PO HS, (Reported)


   Last Action: Reviewed


Levomefolate/B6/B12/Algal Oil 1 Each Capsule, 1 EACH PO BID, (Reported)


   Last Action: Reviewed


Linagliptin 5 Mg Tablet, 5 MG PO DAILY, (Reported)


   Last Action: Reviewed


Losartan Potassium 100 Mg Tablet, 100 MG PO DAILY, (Reported)


   Last Action: Reviewed


Magnesium Oxide 400 Mg Tablet, 400 MG PO DAILY PRN for MUSCLE CRAMPS, (Reported)


   HOLD FOR LOOSE STOOLS 


   Last Action: Reviewed





Patient Home Medication List


Home Medication List Reviewed:  Yes





Past Medical-Social-Family Hx


Patient Social History


Smoking Status:  Never a Smoker


Alcohol Use?:  No





Surgeries


History of Surgeries:  Yes (excision of skin cancer on right hand)





Respiratory


History of Respiratory Disorde:  No





Cardiovascular


History of Cardiac Disorders:  Yes


Cardiac Disorders:  Hypertension, Peripheral Vascular, Valvular Heart Disease





Neurological


History of Neurological Disord:  Yes


Neurological Disorders:  Neuropathy





Gastrointestinal


History of Gastrointestinal Di:  Yes


Gastrointestinal Disorders:  Gastroesophageal Reflux, Gastrointestinal Bleed, 

Polyps





Musculoskeletal


History of Musculoskeletal Dis:  Yes


Musculoskeletal Disorders:  Arthritis





Endocrine


History of Endocrine Disorders:  No





HEENT


History of HEENT Disorders:  Yes


HEENT Disorders:  Cataract


Hearing Impairment:  Hard of Hearing





Cancer


History of Cancer:  Yes


Cancer:  Skin





Psychosocial


History of Psychiatric Problem:  Yes


Behavioral Health Disorders:  Anxiety





Integumentary


History of Skin or Integumenta:  Yes





Family Medical History


Significant Family History:  Cancer, Diabetes, Stroke


Family Medial History:  


Cataract


  03 FATHER


  03 MOTHER


  09 BROTHER


  09 BROTHER


Dementia


  03 MOTHER


Family history: Allergy


  09 BROTHER


Family history: Arthritis


  03 MOTHER


Family history: Diabetes mellitus


  09 BROTHER


Family history: Glaucoma


  03 MOTHER


Family history: Osteoporosis


  03 MOTHER


Hereditary disease


  03 MOTHER


Prostate cancer


  03 FATHER


Stroke


  03 MOTHER





Review of Systems-General


Constitutional:  No chills, No dizziness; weakness (had a little weakness this 

morning when couldn't get blood stopped)


EENTM:  No blurred vision, No vision loss


Respiratory:  cough (pt states has a minor cough sometimes); No short of breath


Cardiovascular:  No chest pain; Hx of Intervention; No palpitations


Gastrointestinal:  No abdominal pain; constipation (patient feels like she has 

it all of the time unless she takes magnesium and stool softeners.. she can't 

take them when she travels); No nausea, No vomiting; other (hematochezia)


Genitourinary:  No dysuria, No frequency, No hematuria


Musculoskeletal:  joint pain, joint swelling, muscle pain, muscle stiffness


Skin:  No change in color; hx of skin cancer


Psychiatric/Neurological:  Anxiety; Denies Depressed, Denies Seizure; Tremors





Physical Exam-General Problems


Physical Exam


General Appearance:  WD/WN, no apparent distress


Eyes:  Bilateral Eye PERRL, Bilateral Eye EOMI


HEENT:  pharynx normal; No scleral icterus (R), No scleral icterus (L)


Neck:  non-tender, supple


Respiratory:  chest non-tender, lungs clear, normal breath sounds, no 

respiratory distress, no accessory muscle use


Cardiovascular:  regular rate, rhythm, no murmur


Gastrointestinal:  normal bowel sounds, non tender, soft, no organomegaly


Rectal:  hemorrhoids (Exam performed with female aid in room, external tag, 

multiple int hemorrhoids; can actually see the tear in one hemorrhoid with blood

coming out)


Back:  no CVA tenderness, no vertebral tenderness


Extremities:  non-tender, no pedal edema, no calf tenderness


Neurologic/Psychiatric:  alert, oriented x 3


Skin:  normal color, warm/dry


Lymphatic:  no adenopathy (neck, axilla or groin)





Assessment/Plan


Rectal Bleed from Internal Hemorrhoid


External hemorrhoidal skin tag





Plan to check H/H in the am, NPO after midnight.  Will start proctofoam HC to be

applied BID.  I went over options with pt; including just watching area, 

steroids, and surgery.  She was very adamant about not wanting to rush


to surgery.  I think waiting and watching is a good choice, bleeding may stop on

its own and will add some steroid cream to help reduce inflammation.  If 

bleeding doesn't stop we will again discuss surgery; which would basically


be cauterizing the hemorrhoids to stop the bleeding.





Supervisory-Addendum Brief


Verification & Attestation


Participated in pt care:  history, MDM, physical


Personally performed:  exam, history, MDM, supervision of care


Care discussed with:  Medical Student


Procedures:  n/a


Verification and Attestation of Medical Student E/M Service





A medical student performed and documented this service. I then reviewed and 

verified all information documented by the medical student and made 

modifications to such information, when appropriate. I personally performed a 

physical exam, medical decision making and then discussed any differences 

between the notes and made revisions as necessary to create one note.





Rafael Mendez , 8/3/21 , 18:04











JAIME COREY                  Aug 3, 2021 15:09


RAFAEL MENDEZ DO                Aug 3, 2021 17:50

## 2021-08-03 NOTE — ED ABDOMINAL PAIN
General


Chief Complaint:  Rect Problems


Stated Complaint:  RECTAL BLEEDING


Source of Information:  Patient





History of Present Illness


Date Seen by Provider:  Aug 3, 2021


Time Seen by Provider:  12:45


Initial Comments


Very pleasant 75-year-old female with past medical history of hypertension, 

external and internal hemorrhoids, and solitary kidney coming in due to 

hemorrhoid bleeding.  She states it started bleeding again on Sunday.  This has 

been an ongoing issue for years and comes and goes.  She has had a normal 

colonoscopy otherwise roughly 1 year ago.  She is not taking any blood thinners 

or antiplatelet agents.  Typically starts when she is constipated.  It is 

typically a consistent drip when it is happening, will stop for some period of 

time, and then when she stands up will have a little bit more bleeding for a 

small amount of time before it slows down again.  She  was unable to really 

quantify how much bleeding there has been.  No lightheadedness, chest pain, 

shortness of breath, nausea, vomiting, diarrhea, or any other concerns.  She 

states she is quite constipated often.  There is no pain associated with this 

really.





Allergies and Home Medications


Allergies


Coded Allergies:  


     NSAIDS (Non-Steroidal Anti-Inflamma (Verified  Allergy, Intermediate, 

3/4/14)


   STATES SHE GETS 'CRAZY'


     Sulfa (Sulfonamide Antibiotics) (Unverified  Allergy, Unknown, 3/4/14)


     ampicillin (Unverified  Allergy, Unknown, 3/4/14)


     prednisone (Unverified  Adverse Reaction, Unknown, 5/1/14)





Home Medications


ALPRAZolam 0.25 Mg Tablet, 0.25 MG PO TID, (Reported)


Allopurinol 100 Mg Tablet, 100 MG PO DAILY, (Reported)


Cholecalciferol (Vitamin D3) 25 Mcg Capsule, 25 MCG PO DAILY, (Reported)


Dexlansoprazole 60 Mg Cap.dr.bp, 60 MG PO DAILY, (Reported)


Dicyclomine HCl 20 Mg Tablet, 20 MG PO QID PRN for HEARTBURN, (Reported)


Doxazosin Mesylate 2 Mg Tablet, 2 MG PO HS, (Reported)


Estrogens Conjugated 1.25 Mg Tab, 2 MG PO DAILY, (Reported)


Gabapentin 400 Mg Capsule, 400 MG PO BID, (Reported)


Glucos Sul 2Kcl/MSM/Chond/C/Mn 1 Each Capsule, 1 EACH PO DAILY, (Reported)


Hydrocodone/Acetaminophen 1 Each Tablet, 1 TAB PO Q6H


   Prescribed by: JAIR WRIGHT on 7/22/20 1451


Hydrocodone/Acetaminophen 1 Each Tablet, 0.5-1 TAB PO Q6H PRN for PAIN-MODERATE 

(5-7)


   Prescribed by: ANNA BATISTA on 6/6/21 1325


Imipramine HCl 50 Mg Tablet, 50 MG PO HS, (Reported)


Levomefolate/B6/B12/Algal Oil 1 Each Capsule, 1 EACH PO BID, (Reported)


Linagliptin 5 Mg Tablet, 5 MG PO DAILY, (Reported)


Losartan Potassium 100 Mg Tablet, 100 MG PO DAILY, (Reported)


Magnesium Oxide 400 Mg Tablet, 400 MG PO BID, (Reported)





Patient Home Medication List


Home Medication List Reviewed:  Yes





Review of Systems


Review of Systems


Constitutional:  no symptoms reported; No fever


EENTM:  No Symptoms Reported


Respiratory:  No Symptoms Reported


Cardiovascular:  No Symptoms Reported; Denies Chest Pain


Gastrointestinal:  Denies Abdominal Pain; Constipated; Denies Diarrhea, Denies 

Nausea, Denies Vomiting


Genitourinary:  No Symptoms Reported


Musculoskeletal:  no symptoms reported


Skin:  no symptoms reported; No rash


Psychiatric/Neurological:  No Symptoms Reported


Endocrine:  No Symptoms Reported


Hematologic/Lymphatic:  No Symptoms Reported





Past Medical-Social-Family Hx


Patient Social History


Tobacco Use?:  No





Immunizations Up To Date


Tetanus Booster (TDap):  Unknown





Seasonal Allergies


Seasonal Allergies:  No





Past Medical History


Surgeries:  Yes


Appendectomy, Gallbladder, Hysterectomy, Nephrectomy


Respiratory:  No


Cardiac:  Yes


High Cholesterol, Hypertension


Neurological:  Yes


Dementia, Neuropathy


Reproductive Disorders:  Yes


GYN History:  Hysterectomy, Menopausal


Genitourinary:  Yes (CHRONIC RENAL INSUFFICIENCY, kidney removed after injury 

from sx)


Gastrointestinal:  Yes (fatty liver, stomach lipoma)


Gastroesophageal Reflux


Musculoskeletal:  Yes


Arthritis, Chronic Back Pain


Endocrine:  Yes


Diabetes, Non-Insulin dep


HEENT:  No


Cancer:  No


Psychosocial:  Yes


Anxiety, Depression


Integumentary:  Yes (mass R hand)


Blood Disorders:  No


Adverse Reaction/Blood Tranf:  No





Family Medical History





Cataract


  03 FATHER


  03 MOTHER


  09 BROTHER


  09 BROTHER


Dementia


  03 MOTHER


Family history: Allergy


  09 BROTHER


Family history: Arthritis


  03 MOTHER


Family history: Diabetes mellitus


  09 BROTHER


Family history: Glaucoma


  03 MOTHER


Family history: Osteoporosis


  03 MOTHER


Hereditary disease


  03 MOTHER


Prostate cancer


  03 FATHER


Stroke


  03 MOTHER





No Family History of:


  Abdominal aortic aneurysm


  [place]'s disease


  Alcoholism


  Aphasia


  Cancer


  Cancer of colon


  Chest pain


  Congenital heart disease


  Congestive heart failure


  Cystic fibrosis


  Dysphagia


  Family history: Alzheimer's disease


  Family history: Asthma


  Family history: Breast disease


  Family history: Cardiovascular disease


  Family history: Coronary thrombosis


  Family history: Gastrointestinal disease


  Family history: Hypertension


  Family history: Thyroid disorder


  Headache


  Hearing loss


  Heart disease


  History of - anemia


  History of - disorder


  History of - respiratory disease


  History of drug abuse


  Human immunodeficiency virus (HIV) seropositivity


  Hypercholesterolemia


  Infertile


  Kidney disease


  Malignant neoplasm of lung


  Myocardial infarction


  Parkinson's disease


  Psychotic disorder


  Seizure disorder


  Tuberculosis


  Visual impairment





Physical Exam


Vital Signs





Vital Signs - First Documented








 8/3/21





 12:35


 


Temp 36.0


 


Pulse 80


 


Resp 18


 


B/P (MAP) 168/57 (94)


 


Pulse Ox 97


 


O2 Delivery Room Air





Capillary Refill :


Height/Weight/BMI


Height: 5'0.00"


Weight: 158lbs. 0.0oz. [id number]; 29.00 BMI


Method:Stated


General Appearance:  WD/WN, no apparent distress


HEENT:  PERRL/EOMI, normal ENT inspection, pharynx normal


Neck:  non-tender, full range of motion


Respiratory:  chest non-tender, lungs clear, normal breath sounds, no 

respiratory distress, no accessory muscle use


Cardiovascular:  normal peripheral pulses, regular rate, rhythm


Gastrointestinal:  normal bowel sounds, non tender, soft; No distended, No 

guarding, No rebound


Rectal:  hemorrhoids, other (External hemorrhoid that is not thrombosed, 

internal hemorrhoid that is not prolapsed with minimal bleeding coming from it, 

exam was performed with nurse in room)


Extremities:  normal range of motion, non-tender, normal inspection


Back:  normal inspection, no vertebral tenderness


Neurologic/Psychiatric:  no motor/sensory deficits, alert, normal mood/affect, o

riented x 3


Skin:  normal color, warm/dry


Lymphatic:  no adenopathy





Progress/Results/Core Measures


Results/Orders


Lab Results





Laboratory Tests








Test


 8/3/21


12:45 Range/Units


 


 


White Blood Count


 6.8 


 4.3-11.0


10^3/uL


 


Red Blood Count


 4.06 


 3.80-5.11


10^6/uL


 


Hemoglobin 12.0  11.5-16.0  g/dL


 


Hematocrit 36  35-52  %


 


Mean Corpuscular Volume 88  80-99  fL


 


Mean Corpuscular Hemoglobin 30  25-34  pg


 


Mean Corpuscular Hemoglobin


Concent 33 


 32-36  g/dL





 


Red Cell Distribution Width 11.9  10.0-14.5  %


 


Platelet Count


 191 


 130-400


10^3/uL


 


Mean Platelet Volume 10.0  9.0-12.2  fL


 


Immature Granulocyte % (Auto) 0   %


 


Neutrophils (%) (Auto) 71  42-75  %


 


Lymphocytes (%) (Auto) 20  12-44  %


 


Monocytes (%) (Auto) 7  0-12  %


 


Eosinophils (%) (Auto) 1  0-10  %


 


Basophils (%) (Auto) 0  0-10  %


 


Neutrophils # (Auto)


 4.8 


 1.8-7.8


10^3/uL


 


Lymphocytes # (Auto)


 1.4 


 1.0-4.0


10^3/uL


 


Monocytes # (Auto)


 0.5 


 0.0-1.0


10^3/uL


 


Eosinophils # (Auto)


 0.1 


 0.0-0.3


10^3/uL


 


Basophils # (Auto)


 0.0 


 0.0-0.1


10^3/uL


 


Immature Granulocyte # (Auto)


 0.0 


 0.0-0.1


10^3/uL


 


Prothrombin Time 13.1  12.2-14.7  SEC


 


INR Comment 1.0  0.8-1.4  


 


Sodium Level 125 *L 135-145  MMOL/L


 


Potassium Level 4.6  3.6-5.0  MMOL/L


 


Chloride Level 91 L   MMOL/L


 


Carbon Dioxide Level 22  21-32  MMOL/L


 


Anion Gap 12  5-14  MMOL/L


 


Blood Urea Nitrogen 18  7-18  MG/DL


 


Creatinine


 1.27 


 0.60-1.30


MG/DL


 


Estimat Glomerular Filtration


Rate 41 


  





 


BUN/Creatinine Ratio 14   


 


Glucose Level 130 H   MG/DL


 


Calcium Level 9.8  8.5-10.1  MG/DL


 


Corrected Calcium 10.0  8.5-10.1  MG/DL


 


Total Bilirubin 0.4  0.1-1.0  MG/DL


 


Aspartate Amino Transf


(AST/SGOT) 24 


 5-34  U/L





 


Alanine Aminotransferase


(ALT/SGPT) 17 


 0-55  U/L





 


Alkaline Phosphatase 62    U/L


 


Total Protein 7.1  6.4-8.2  GM/DL


 


Albumin 3.8  3.2-4.5  GM/DL








My Orders





Orders - LOREE BARROW MD


Cbc With Automated Diff (8/3/21 12:53)


Comprehensive Metabolic Panel (8/3/21 12:53)


Protime With Inr (8/3/21 12:53)


Type And Screen (8/3/21 14:21)





Vital Signs/I&O











 8/3/21





 12:35


 


Temp 36.0


 


Pulse 80


 


Resp 18


 


B/P (MAP) 168/57 (94)


 


Pulse Ox 97


 


O2 Delivery Room Air











Progress


Progress Note :  


Progress Note


75-year-old female with above history coming in due to bleeding from her 

hemorrhoid.  ABCs were intact and vitals were stable on presentation.  On exam 

she does have external and internal hemorrhoids.  The bleeding does appear to be

very slow drip from the internal hemorrhoid that is not thrombosed or prolapsed.

 Will get basic labs including CBC to assess for the significance of her 

bleeding.


Hemoglobin is 12 which is her baseline.  Sodium is 125 with it being 128 a 

couple months ago.  She has no weakness or confusion and given the recent sodium

being in the 120s as well this is likely chronic in nature, however is 

continuing to trend down.  The patient had one episode of bleeding while having 

a bowel movement and it was roughly 1cup of clotted blood within the bedside 

commode.  Vitals remained stable at this time.  Afterwards it was continuing to 

be a slow drip of blood.  Consulted with general surgery and sent a type and 

screen.  Do not believe she needs blood products at this time but I do believe 

she will require admission for further observation.  I discussed the case with 

Dr. MURRAY who will be the admitting physician under observation.





Departure


Impression





   Primary Impression:  


   Internal hemorrhoid, bleeding


   Additional Impression:  


   Hyponatremia


Disposition:  09 ADMITTED AS INPATIENT


Condition:  Stable





Admissions


Decision to Admit Reason:  Admit from ER (General)


Decision to Admit/Date:  Aug 3, 2021


Time/Decision to Admit Time:  14:29





Departure-Patient Inst.


Referrals:  


CORNELIUS PEREZ JACQUELINE S DO (PCP/Family)


Primary Care Physician











LOREE BARROW MD           Aug 3, 2021 13:03

## 2021-08-04 ENCOUNTER — HOSPITAL ENCOUNTER (OUTPATIENT)
Dept: HOSPITAL 75 - LABNPT | Age: 76
End: 2021-08-04
Attending: FAMILY MEDICINE
Payer: MEDICARE

## 2021-08-04 VITALS — SYSTOLIC BLOOD PRESSURE: 104 MMHG | DIASTOLIC BLOOD PRESSURE: 43 MMHG

## 2021-08-04 VITALS — SYSTOLIC BLOOD PRESSURE: 122 MMHG | DIASTOLIC BLOOD PRESSURE: 41 MMHG

## 2021-08-04 VITALS — DIASTOLIC BLOOD PRESSURE: 60 MMHG | SYSTOLIC BLOOD PRESSURE: 130 MMHG

## 2021-08-04 VITALS — DIASTOLIC BLOOD PRESSURE: 56 MMHG | SYSTOLIC BLOOD PRESSURE: 121 MMHG

## 2021-08-04 VITALS — SYSTOLIC BLOOD PRESSURE: 142 MMHG | DIASTOLIC BLOOD PRESSURE: 63 MMHG

## 2021-08-04 VITALS — DIASTOLIC BLOOD PRESSURE: 40 MMHG | SYSTOLIC BLOOD PRESSURE: 120 MMHG

## 2021-08-04 VITALS — SYSTOLIC BLOOD PRESSURE: 131 MMHG | DIASTOLIC BLOOD PRESSURE: 58 MMHG

## 2021-08-04 VITALS — DIASTOLIC BLOOD PRESSURE: 45 MMHG | SYSTOLIC BLOOD PRESSURE: 98 MMHG

## 2021-08-04 VITALS — SYSTOLIC BLOOD PRESSURE: 107 MMHG | DIASTOLIC BLOOD PRESSURE: 51 MMHG

## 2021-08-04 VITALS — SYSTOLIC BLOOD PRESSURE: 112 MMHG | DIASTOLIC BLOOD PRESSURE: 42 MMHG

## 2021-08-04 VITALS — SYSTOLIC BLOOD PRESSURE: 141 MMHG | DIASTOLIC BLOOD PRESSURE: 63 MMHG

## 2021-08-04 VITALS — SYSTOLIC BLOOD PRESSURE: 107 MMHG | DIASTOLIC BLOOD PRESSURE: 43 MMHG

## 2021-08-04 VITALS — SYSTOLIC BLOOD PRESSURE: 140 MMHG | DIASTOLIC BLOOD PRESSURE: 64 MMHG

## 2021-08-04 DIAGNOSIS — Z53.9: Primary | ICD-10-CM

## 2021-08-04 LAB
BASOPHILS # BLD AUTO: 0 10^3/UL (ref 0–0.1)
BASOPHILS NFR BLD AUTO: 0 % (ref 0–10)
BUN/CREAT SERPL: 16
CALCIUM SERPL-MCNC: 8.1 MG/DL (ref 8.5–10.1)
CHLORIDE SERPL-SCNC: 95 MMOL/L (ref 98–107)
CO2 SERPL-SCNC: 19 MMOL/L (ref 21–32)
CREAT SERPL-MCNC: 1.25 MG/DL (ref 0.6–1.3)
EOSINOPHIL # BLD AUTO: 0.1 10^3/UL (ref 0–0.3)
EOSINOPHIL NFR BLD AUTO: 1 % (ref 0–10)
EOSINOPHIL NFR BLD MANUAL: 1 %
GFR SERPLBLD BASED ON 1.73 SQ M-ARVRAT: 42 ML/MIN
GLUCOSE SERPL-MCNC: 130 MG/DL (ref 70–105)
HCT VFR BLD CALC: 25 % (ref 35–52)
HGB BLD-MCNC: 8.3 G/DL (ref 11.5–16)
LYMPHOCYTES # BLD AUTO: 1 10^3/UL (ref 1–4)
LYMPHOCYTES NFR BLD AUTO: 17 % (ref 12–44)
MCH RBC QN AUTO: 30 PG (ref 25–34)
MCHC RBC AUTO-ENTMCNC: 33 G/DL (ref 32–36)
MCV RBC AUTO: 89 FL (ref 80–99)
METAMYELOCYTES NFR BLD: 1 %
MONOCYTES # BLD AUTO: 0.6 10^3/UL (ref 0–1)
MONOCYTES NFR BLD AUTO: 10 % (ref 0–12)
MONOCYTES NFR BLD: 7 %
NEUTROPHILS # BLD AUTO: 4.3 10^3/UL (ref 1.8–7.8)
NEUTROPHILS NFR BLD AUTO: 72 % (ref 42–75)
NEUTS BAND NFR BLD MANUAL: 78 %
PLATELET # BLD: 156 10^3/UL (ref 130–400)
PMV BLD AUTO: 9.9 FL (ref 9–12.2)
POTASSIUM SERPL-SCNC: 4.7 MMOL/L (ref 3.6–5)
SODIUM SERPL-SCNC: 124 MMOL/L (ref 135–145)
VARIANT LYMPHS NFR BLD MANUAL: 13 %
WBC # BLD AUTO: 6 10^3/UL (ref 4.3–11)

## 2021-08-04 RX ADMIN — SODIUM CHLORIDE SCH MLS/HR: 900 INJECTION, SOLUTION INTRAVENOUS at 04:45

## 2021-08-04 RX ADMIN — INSULIN ASPART SCH UNIT: 100 INJECTION, SOLUTION INTRAVENOUS; SUBCUTANEOUS at 17:03

## 2021-08-04 RX ADMIN — PRAMOXINE HYDROCHLORIDE HYDROCORTISONE ACETATE SCH APPLIC: 100; 100 AEROSOL, FOAM TOPICAL at 20:59

## 2021-08-04 RX ADMIN — IMIPRAMINE HYDROCHLORIDE SCH MG: 25 TABLET ORAL at 20:44

## 2021-08-04 RX ADMIN — SODIUM CHLORIDE SCH MLS/HR: 900 INJECTION, SOLUTION INTRAVENOUS at 06:41

## 2021-08-04 RX ADMIN — INSULIN ASPART SCH UNIT: 100 INJECTION, SOLUTION INTRAVENOUS; SUBCUTANEOUS at 12:51

## 2021-08-04 RX ADMIN — GABAPENTIN SCH MG: 400 CAPSULE ORAL at 20:46

## 2021-08-04 RX ADMIN — LOSARTAN POTASSIUM SCH MG: 100 TABLET, FILM COATED ORAL at 13:32

## 2021-08-04 RX ADMIN — Medication SCH ML: at 06:29

## 2021-08-04 RX ADMIN — GABAPENTIN SCH MG: 400 CAPSULE ORAL at 13:32

## 2021-08-04 RX ADMIN — Medication SCH ML: at 21:00

## 2021-08-04 RX ADMIN — PRAMOXINE HYDROCHLORIDE HYDROCORTISONE ACETATE SCH GM: 100; 100 AEROSOL, FOAM TOPICAL at 13:32

## 2021-08-04 RX ADMIN — INSULIN ASPART SCH UNIT: 100 INJECTION, SOLUTION INTRAVENOUS; SUBCUTANEOUS at 20:59

## 2021-08-04 RX ADMIN — INSULIN ASPART SCH UNIT: 100 INJECTION, SOLUTION INTRAVENOUS; SUBCUTANEOUS at 06:25

## 2021-08-04 RX ADMIN — DOXAZOSIN MESYLATE SCH MG: 2 TABLET ORAL at 20:45

## 2021-08-04 RX ADMIN — PANTOPRAZOLE SODIUM SCH MG: 40 TABLET, DELAYED RELEASE ORAL at 13:32

## 2021-08-04 RX ADMIN — Medication SCH ML: at 14:03

## 2021-08-04 RX ADMIN — PRAMOXINE HYDROCHLORIDE HYDROCORTISONE ACETATE SCH APPLIC: 100; 100 AEROSOL, FOAM TOPICAL at 20:47

## 2021-08-04 NOTE — OPERATIVE REPORT
DATE OF SERVICE:  



PREOPERATIVE DIAGNOSIS:

Rectal bleed.



POSTOPERATIVE DIAGNOSES:

External hemorrhoid and internal hemorrhoids with bleeding.



PROCEDURES:

1.  Destruction of internal hemorrhoidal columns, three of them.

2.  Excision of external hemorrhoid.



SURGEON:

Rafael Mendez DO



FIRST ASSISTANT:

None.



ANESTHESIA:

LMA.



BLOOD LOSS:

Less than 10 mL.



FLUIDS:

Per anesthesia.



POSTOPERATIVE CONDITION:

Stable.



INDICATION FOR PROCEDURE:

The patient is a 75-year-old female who has been having some rectal bleeding

enough that actually caused anemia from the internal hemorrhoid.



FINDINGS:

The patient had a large external hemorrhoid and she had some internal

hemorrhoids, actually could see one bleeding and was actually pulling out a

small stream of blood while we were prepping and getting the patient ready.



PROCEDURE NOTE:

After informed consent was obtained, the patient was brought to the operating

room, placed on the table in lithotomy position.  She was sterilely prepped and

draped in normal fashion.  Local lidocaine was used to perform ischial

tuberosity blocks as well as block around the anus.  There was an external

hemorrhoid sticking out, then we elected to remove this with a LigaSure,

clamping, coagulating and then transecting to remove this LigaSure and the

bleeding from the internal hemorrhoids, elected to destroy the hemorrhoidal

columns with a LigaSure, clamping, coagulating but not cutting these off, doing

this at the right, the left lateral, right anterior and right posterior to stop

the bleeding.  There was no bleeding from the hemorrhoids or from the rectum at

the end of the case.  Area was cleaned and dried and placed a Gelfoam into the

rectum to help control bleeding and then covered this with dressing.  The

patient tolerated the procedure.  Sponge, instrument and needle count correct at

the end of the case.





Job ID: [id number]

DocumentID: [id number]

Dictated Date:  08/04/2021 14:45:35

Transcription Date: 08/04/2021 22:11:21

Dictated By: RAFAEL MENDEZ DO

## 2021-08-04 NOTE — PROGRESS NOTE - SURGERY
NOELPARISESTER 8/4/21 0759:


Subjective


Date Seen by a Provider:  Aug 4, 2021


Time Seen by a Provider:  07:15


Subjective/Events-last exam


Pt resting comfortably - continues to bleed from rectum - HbG down to 8.3


Review of Systems


General:  No Chills, No Night Sweats, No Fatigue


HEENT:  No Head Aches, No Visual Changes


Pulmonary:  No Dyspnea


Cardiovascular:  No: Chest Pain


Gastrointestinal:  No: Nausea, Vomiting


Genitourinary:  No Dysuria


Musculoskeletal:  No: other, neck pain, shoulder pain, arm pain, back pain, hand

pain, leg pain, foot pain


Neurological:  Numbness (Chronic LE); No: Weakness





Objective


Exam





Vital Signs








  Date Time  Temp Pulse Resp B/P (MAP) Pulse Ox O2 Delivery O2 Flow Rate FiO2


 


8/4/21 04:13 36.3 76 20 131/58 (82) 98 Room Air  


 


8/4/21 01:00  76      


 


8/4/21 00:23 36.0       


 


8/4/21 00:23 36.0 82 20 142/63 (89) 99 Room Air  


 


8/3/21 20:00      Room Air  


 


8/3/21 19:39 36.3 77 20 140/64 (89) 99 Room Air  


 


8/3/21 19:00  80      


 


8/3/21 16:50      Room Air  


 


8/3/21 16:00 36.6 71 20 132/97 (109) 99 Room Air  


 


8/3/21 15:25  85 16 121/90 97   


 


8/3/21 12:35 36.0 80 18 168/57 (94) 97 Room Air  














I & O 


 


 8/4/21





 06:59


 


Intake Total 333 ml


 


Balance 333 ml





Capillary Refill : Less Than 3 Seconds


General Appearance:  No Apparent Distress


Respiratory:  Lungs Clear, Normal Breath Sounds


Cardiovascular:  Regular Rate, Rhythm, No JVD, No Murmur


Peripheral Pulses:  2+ Carotid (R), 2+ Carotid (L)


Neurologic/Psychiatric:  Alert, Oriented x3


Skin:  Normal Color, Warm/Dry





Results


Lab


Laboratory Tests


8/3/21 12:45: 


White Blood Count 6.8, Red Blood Count 4.06, Hemoglobin 12.0, Hematocrit 36, 

Mean Corpuscular Volume 88, Mean Corpuscular Hemoglobin 30, Mean Corpuscular 

Hemoglobin Concent 33, Red Cell Distribution Width 11.9, Platelet Count 191, 

Mean Platelet Volume 10.0, Immature Granulocyte % (Auto) 0, Neutrophils (%) 

(Auto) 71, Lymphocytes (%) (Auto) 20, Monocytes (%) (Auto) 7, Eosinophils (%) 

(Auto) 1, Basophils (%) (Auto) 0, Neutrophils # (Auto) 4.8, Lymphocytes # (Auto)

1.4, Monocytes # (Auto) 0.5, Eosinophils # (Auto) 0.1, Basophils # (Auto) 0.0, 

Immature Granulocyte # (Auto) 0.0, Prothrombin Time 13.1, INR Comment 1.0, 

Sodium Level 125*L, Potassium Level 4.6, Chloride Level 91L, Carbon Dioxide 

Level 22, Anion Gap 12, Blood Urea Nitrogen 18, Creatinine 1.27, Estimat 

Glomerular Filtration Rate 41, BUN/Creatinine Ratio 14, Glucose Level 130H, 

Calcium Level 9.8, Corrected Calcium 10.0, Total Bilirubin 0.4, Aspartate Amino 

Transf (AST/SGOT) 24, Alanine Aminotransferase (ALT/SGPT) 17, Alkaline 

Phosphatase 62, Total Protein 7.1, Albumin 3.8


8/3/21 20:54: Glucometer 155H


8/4/21 05:34: 


White Blood Count 6.0, Red Blood Count 2.80L, Hemoglobin 8.3#L, Hematocrit 25L, 

Mean Corpuscular Volume 89, Mean Corpuscular Hemoglobin 30, Mean Corpuscular 

Hemoglobin Concent 33, Red Cell Distribution Width 12.0, Platelet Count 156, 

Mean Platelet Volume 9.9, Immature Granulocyte % (Auto) 1, Neutrophils (%) 

(Auto) 72, Lymphocytes (%) (Auto) 17, Monocytes (%) (Auto) 10, Eosinophils (%) 

(Auto) 1, Basophils (%) (Auto) 0, Neutrophils # (Auto) 4.3, Lymphocytes # (Auto)

1.0, Monocytes # (Auto) 0.6, Eosinophils # (Auto) 0.1, Basophils # (Auto) 0.0, 

Immature Granulocyte # (Auto) 0.0, Sodium Level 124*L, Potassium Level 4.7, 

Chloride Level 95L, Carbon Dioxide Level 19L, Anion Gap 10, Blood Urea Nitrogen 

20H, Creatinine 1.25, Estimat Glomerular Filtration Rate 42, BUN/Creatinine 

Ratio 16, Glucose Level 130H, Calcium Level 8.1L, Neutrophils % (Manual) 78, 

Lymphocytes % (Manual) 13, Monocytes % (Manual) 7, Eosinophils % (Manual) 1, 

Metamyelocytes % 1





Assessment/Plan


Rectal Bleed from Internal Hemorrhoid


External hemorrhoidal skin tag





Plan to check H/H in the am, NPO after midnight.  Will start proctofoam HC to be

applied BID.  I went over options with pt; including just watching area, 

steroids, and surgery.  She was very adamant about not wanting to rush


to surgery.  I think waiting and watching is a good choice, bleeding may stop on

its own and will add some steroid cream to help reduce inflammation.  If 

bleeding doesn't stop we will again discuss surgery; which would basically


be cauterizing the hemorrhoids to stop the bleeding.








PT continues to bleed from the rectum





Plan - surgical cauterizing of hemorrhoids to stop bleeding





JAIR MENDEZ DO 8/4/21 1415:


Subjective


Time Seen by a Provider:  13:31


Subjective/Events-last exam


Pt seen and examined, states she feels a little weaker today.  She heard blood 

dripping into toilet.


Review of Systems


General:  Fatigue


Pulmonary:  No Dyspnea, No Cough


Cardiovascular:  No: Chest Pain, Palpitations


Gastrointestinal:  No: Nausea, Vomiting





Objective


Exam


General Appearance:  No Apparent Distress, Obese


Respiratory:  Lungs Clear, Normal Breath Sounds, No Accessory Muscle Use, No 

Respiratory Distress


Cardiovascular:  Regular Rate, Rhythm, No Murmur


Gastrointestinal:  non tender, soft, no organomegaly, other (bleeding internal 

hemorrhoids)


Neurologic/Psychiatric:  Alert, Oriented x3





Assessment/Plan


Rectal Bleed from Internal Hemorrhoid


External hemorrhoidal skin tag





Plan to check H/H in the am, NPO after midnight.  Will start proctofoam HC to be

applied BID.  I went over options with pt; including just watching area, 

steroids, and surgery.  She was very adamant about not wanting to rush


to surgery.  I think waiting and watching is a good choice, bleeding may stop on

its own and will add some steroid cream to help reduce inflammation.  If 

bleeding doesn't stop we will again discuss surgery; which would basically


be cauterizing the hemorrhoids to stop the bleeding.





PT continues to bleed from the rectum





Plan - surgical cauterization or coagulation of hemorrhoids to stop bleeding, 

possible excision.





Supervisory-Addendum Brief


Verification & Attestation


Participated in pt care:  history, MDM, physical


Personally performed:  exam, history, MDM, supervision of care


Care discussed with:  Medical Student


Procedures:  n/a


Verification and Attestation of Medical Student E/M Service





A medical student performed and documented this service. I then reviewed and 

verified all information documented by the medical student and made 

modifications to such information, when appropriate. I personally performed a 

physical exam, medical decision making and then discussed any differences 

between the notes and made revisions as necessary to create one note.





Jair Mendez , 8/4/21 , 14:14











ESTER ELLISON                Aug 4, 2021 07:59


JAIR MENDEZ DO                Aug 4, 2021 14:15

## 2021-08-04 NOTE — PROGRESS NOTE-POST OPERATIVE
Post-Operative Progess Note


Surgeon (s)/Assistant (s)


Surgeon


JAIR WRIGHT DO


Assistant:  none





Pre-Operative Diagnosis


rectal bleed





Post-Operative Diagnosis





Anemia secondary to blood loss from Internal hemorrhoid


Ext hemorrhoid





Procedure & Operative Findings


Date of Procedure


8/4/21


Procedure Performed/Findings


1) Destruction of int hemorrhoids - 3 columns


2) Exc of ext hemorrhoid


Anesthesia Type


LMA





Estimated Blood Loss


Estimated blood loss (mL):  appx 10ml





Specimens/Packing


Specimens Removed


ext hemorrhoid











JAIR WRIGHT DO                Aug 4, 2021 14:47

## 2021-08-04 NOTE — HISTORY & PHYSICAL
History of Present Illness


History of Present Illness


Reason for visit/HPI


This is a 75 year old female with a known history of intermittent bleeding 

external hemorrhoids.  She presented to the ER with complaints of bleeding 

hemorrhoids for at least 3 days.  She had tried her usual medications and 

pressure to try to stop the bleeding with no success.  In the emergency room, 

her H/H were stable and she was noted to have bleeding from internal 

hemorrhoids.  Initially the plan was for DC to home with fwup with surgery for 

outpatient intervention, however, she then had an episode of rectal bleeding 

that was large--about a cup of blood in the commode.  She will be admitted and 

started on steroid suppositories with surgical consult and monitoring of H/H.


Date of Admission


Aug 3, 2021 at 14:41


Date Seen by a Provider:  Aug 4, 2021


Time Seen by a Provider:  08:29


I consulted on this patient on


8/4/21


 08:29


Attending Physician


Shanna Jones DO


Admitting Physician


Shanna Jones DO


Consult








Allergies and Home Medications


Allergies


Coded Allergies:  


     NSAIDS (Non-Steroidal Anti-Inflamma (Verified  Allergy, Intermediate, 

3/4/14)


   STATES SHE GETS 'CRAZY'


     Sulfa (Sulfonamide Antibiotics) (Unverified  Allergy, Unknown, 3/4/14)


     ampicillin (Unverified  Allergy, Unknown, 3/4/14)


     prednisone (Unverified  Adverse Reaction, Unknown, 5/1/14)





Home Medications


ALPRAZolam 0.25 Mg Tablet, 0.25 MG PO TID PRN for ANXIETY, (Reported)


   Last Action: Continued


Allopurinol 100 Mg Tablet, 100 MG PO DAILY, (Reported)


   Last Action: Reviewed


Cholecalciferol (Vitamin D3) 25 Mcg Capsule, 25 MCG PO DAILY, (Reported)


   Last Action: Reviewed


Dexlansoprazole 60 Mg Cap.dr.bp, 60 MG PO DAILY, (Reported)


   Last Action: Converted


Dicyclomine HCl 20 Mg Tablet, 20 MG PO QID PRN for ABDOMINAL CRAMPING, 

(Reported)


   Last Action: Reviewed


Doxazosin Mesylate 2 Mg Tablet, 2 MG PO HS, (Reported)


   Last Action: Continued


Estrogens Conjugated 1.25 Mg Tab, 1.25 MG PO DAILY, (Reported)


   Last Action: Reviewed


Gabapentin 400 Mg Capsule, 400 MG PO BID, (Reported)


   Last Action: Continued


Glucos Sul 2Kcl/MSM/Chond/C/Mn 1 Each Capsule, 1 EACH PO DAILY, (Reported)


   Last Action: Reviewed


Imipramine HCl 50 Mg Tablet, 50 MG PO HS, (Reported)


   Last Action: Converted


Levomefolate/B6/B12/Algal Oil 1 Each Capsule, 1 EACH PO BID, (Reported)


   Last Action: Reviewed


Linagliptin 5 Mg Tablet, 5 MG PO DAILY, (Reported)


   Last Action: Continued


Losartan Potassium 100 Mg Tablet, 100 MG PO DAILY, (Reported)


   Last Action: Continued


Magnesium Oxide 400 Mg Tablet, 400 MG PO DAILY PRN for MUSCLE CRAMPS, (Reported)


   HOLD FOR LOOSE STOOLS 


   Last Action: Continued





Patient Home Medication List


Home Medication List Reviewed:  Yes





Past Medical-Social-Family Hx


Patient Social History


Tobacco Use?:  No


Smoking Status:  Never a Smoker


Use of E-Cig and/or Vaping dev:  No


Substance use?:  No


Alcohol Use?:  No


Pt feels they are or have been:  No





Immunizations Up To Date


Date of Influenza Vaccine:  Oct 1, 2019


First/Initial COVID19 Vaccinat:  FEB


Second COVID19 Vaccination Chepe:  MAR


Tetanus Booster (TDap):  Unknown


Hepatitis A:  No


Hepatitis B:  No





Seasonal Allergies


Seasonal Allergies:  No





Current Status


Pregnancy status:  No


Breastfeeding status:  No


Advance Directives:  Yes


Advance Directive Location:  Home


Communicates:  Verbally


Primary Language:  English


Preferred Spoken Language:  English


Is interpretation needed?:  No


Implanted or Applied Medical D:  None





Past Medical History


Surgeries:  Appendectomy, Gallbladder, Hysterectomy, Nephrectomy


Hypertension, Peripheral Vascular, Valvular Heart Disease


Neuropathy


GYN History:  Hysterectomy, Menopausal


Gastroesophageal Reflux, Gastrointestinal Bleed, Polyps


Arthritis


Diabetes, Non-Insulin dep


Cataract


Hearing Impairment:  Hard of Hearing


Skin


Anxiety


Blood Disorders:  No


Adverse Reaction/Blood Tranf:  No





Family Medical History





Cataract


  03 FATHER


  03 MOTHER


  09 BROTHER


  09 BROTHER


Dementia


  03 MOTHER


Family history: Allergy


  09 BROTHER


Family history: Arthritis


  03 MOTHER


Family history: Diabetes mellitus


  09 BROTHER


Family history: Glaucoma


  03 MOTHER


Family history: Osteoporosis


  03 MOTHER


Hereditary disease


  03 MOTHER


Prostate cancer


  03 FATHER


Stroke


  03 MOTHER





No Family History of:


  Abdominal aortic aneurysm


  Andres's disease


  Alcoholism


  Aphasia


  Cancer


  Cancer of colon


  Chest pain


  Congenital heart disease


  Congestive heart failure


  Cystic fibrosis


  Dysphagia


  Family history: Alzheimer's disease


  Family history: Asthma


  Family history: Breast disease


  Family history: Cardiovascular disease


  Family history: Coronary thrombosis


  Family history: Gastrointestinal disease


  Family history: Hypertension


  Family history: Thyroid disorder


  Headache


  Hearing loss


  Heart disease


  History of - anemia


  History of - disorder


  History of - respiratory disease


  History of drug abuse


  Human immunodeficiency virus (HIV) seropositivity


  Hypercholesterolemia


  Infertile


  Kidney disease


  Malignant neoplasm of lung


  Myocardial infarction


  Parkinson's disease


  Psychotic disorder


  Seizure disorder


  Tuberculosis


  Visual impairment


Cancer, Diabetes, Stroke





Review of Systems


Constitutional:  weakness


EENTM:  No see HPI, No no symptoms reported, No ear discharge, No hearing loss, 

No ear pain, No blurred vision, No double vision, No eye pain, No tearing, No 

vision loss, No dental problems, No hoarseness, No mouth pain, No mouth 

swelling, No epistaxis, No nose congestion, No nose pain, No throat pain, No 

throat swelling, No other


Respiratory:  No no symptoms reported, No see HPI, No cough, No dyspnea on 

exertion, No hemoptysis, No orthopnea, No phlegm, No short of breath, No 

stridor, No wheezing, No other


Cardiovascular:  No no symptoms reported, No see HPI, No chest pain, No edema, 

No Hx of Intervention, No palpitations, No syncope, No vascular heart diseas, No

other


Gastrointestinal:  loss of appetite, other (bright red blood per rectum)


Genitourinary:  No no symptoms reported, No see HPI, No decreased output, No 

discharge, No dysuria, No frequency, No hematuria, No hesitancy, No 

incontinence, No nocturia, No pain, No other


Musculoskeletal:  muscle weakness


Skin:  No no symptoms reported, No see HPI, No change in color, No change in 

hair/nails, No dryness, No hx of skin cancer, No lesions, No lumps, No pruritus,

No rash, No other


Psychiatric/Neurological:  Paresthesia, Pre-Existing Deficit, Tremors, Weakness





Physical Exam


Vital Signs





Vital Signs - First Documented








 8/3/21





 12:35


 


Temp 36.0


 


Pulse 80


 


Resp 18


 


B/P (MAP) 168/57 (94)


 


Pulse Ox 97


 


O2 Delivery Room Air





Capillary Refill : Less Than 3 Seconds


Height, Weight, BMI


Height: 5'0.00"


Weight: 158lbs. 0.0oz. [id number]; 33.79 BMI


Method:Stated


General Appearance:  No Apparent Distress


Neck:  Supple


Respiratory:  Lungs Clear


Cardiovascular:  Regular Rate, Rhythm


Gastrointestinal:  Normal Bowel Sounds, Soft, Tenderness (mild generalized)


Rectal:  Deferred


Extremity:  Non Tender, No Calf Tenderness, No Pedal Edema


Neurologic/Psychiatric:  Alert, Oriented x3


Skin:  Warm/Dry


Comments


Laboratory Tests


8/3/21 12:45: 


White Blood Count 6.8, Red Blood Count 4.06, Hemoglobin 12.0, Hematocrit 36, 

Mean Corpuscular Volume 88, Mean Corpuscular Hemoglobin 30, Mean Corpuscular 

Hemoglobin Concent 33, Red Cell Distribution Width 11.9, Platelet Count 191, 

Mean Platelet Volume 10.0, Immature Granulocyte % (Auto) 0, Neutrophils (%) 

(Auto) 71, Lymphocytes (%) (Auto) 20, Monocytes (%) (Auto) 7, Eosinophils (%) 

(Auto) 1, Basophils (%) (Auto) 0, Neutrophils # (Auto) 4.8, Lymphocytes # (Auto)

1.4, Monocytes # (Auto) 0.5, Eosinophils # (Auto) 0.1, Basophils # (Auto) 0.0, 

Immature Granulocyte # (Auto) 0.0, Prothrombin Time 13.1, INR Comment 1.0, 

Sodium Level 125*L, Potassium Level 4.6, Chloride Level 91L, Carbon Dioxide 

Level 22, Anion Gap 12, Blood Urea Nitrogen 18, Creatinine 1.27, Estimat 

Glomerular Filtration Rate 41, BUN/Creatinine Ratio 14, Glucose Level 130H, 

Calcium Level 9.8, Corrected Calcium 10.0, Total Bilirubin 0.4, Aspartate Amino 

Transf (AST/SGOT) 24, Alanine Aminotransferase (ALT/SGPT) 17, Alkaline 

Phosphatase 62, Total Protein 7.1, Albumin 3.8


8/3/21 20:54: Glucometer 155H


8/4/21 05:34: 


White Blood Count 6.0, Red Blood Count 2.80L, Hemoglobin 8.3#L, Hematocrit 25L, 

Mean Corpuscular Volume 89, Mean Corpuscular Hemoglobin 30, Mean Corpuscular 

Hemoglobin Concent 33, Red Cell Distribution Width 12.0, Platelet Count 156, 

Mean Platelet Volume 9.9, Immature Granulocyte % (Auto) 1, Neutrophils (%) 

(Auto) 72, Lymphocytes (%) (Auto) 17, Monocytes (%) (Auto) 10, Eosinophils (%) 

(Auto) 1, Basophils (%) (Auto) 0, Neutrophils # (Auto) 4.3, Lymphocytes # (Auto)

1.0, Monocytes # (Auto) 0.6, Eosinophils # (Auto) 0.1, Basophils # (Auto) 0.0, 

Immature Granulocyte # (Auto) 0.0, Sodium Level 124*L, Potassium Level 4.7, 

Chloride Level 95L, Carbon Dioxide Level 19L, Anion Gap 10, Blood Urea Nitrogen 

20H, Creatinine 1.25, Estimat Glomerular Filtration Rate 42, BUN/Creatinine 

Ratio 16, Glucose Level 130H, Calcium Level 8.1L, Neutrophils % (Manual) 78, 

Lymphocytes % (Manual) 13, Monocytes % (Manual) 7, Eosinophils % (Manual) 1, 

Metamyelocytes % 1





Assessment/Plan


Assessment and Plan


1.  Acute Lower GI Hemorrhage from Internal Hemorrhoids--on anusol HC 

suppositories with surgical consult--still bleeding so will likely need i

ntervention


2.  Acute Blood Loss Anemia--H/H has dropped to 8.3 and 25--will monitor 


3.  Hyponatremia--give a bag of hypertonic saline and repeat Na tomorrow


4.  GERD--back on protonix


5.  Chronic Renal Insufficiency--hydrating and BUN/Cr stable


6.  Diabetes mellitus--on accuchecks with SSI





Admission Diagnosis


Admission Status:  Observation











SHANNA JONES DO         Aug 4, 2021 08:35

## 2021-08-04 NOTE — ANESTHESIA-GENERAL POST-OP
General


Patient Condition


Mental Status/LOC:  Same as Preop


Cardiovascular:  Satisfactory


Nausea/Vomiting:  Absent


Respiratory:  Satisfactory


Pain:  Controlled


Complications:  Absent





Post Op Complications


Complications


None





Follow Up Care/Instructions


Patient Instructions


None needed.





Anesthesia/Patient Condition


Patient Condition


Patient is doing well, no complaints, stable vital signs, no apparent adverse 

anesthesia problems.   


No complications reported per nursing.











ELLE VILLEGAS CRNA           Aug 4, 2021 14:56

## 2021-08-05 VITALS — SYSTOLIC BLOOD PRESSURE: 143 MMHG | DIASTOLIC BLOOD PRESSURE: 60 MMHG

## 2021-08-05 VITALS — DIASTOLIC BLOOD PRESSURE: 73 MMHG | SYSTOLIC BLOOD PRESSURE: 124 MMHG

## 2021-08-05 VITALS — SYSTOLIC BLOOD PRESSURE: 101 MMHG | DIASTOLIC BLOOD PRESSURE: 65 MMHG

## 2021-08-05 VITALS — SYSTOLIC BLOOD PRESSURE: 112 MMHG | DIASTOLIC BLOOD PRESSURE: 70 MMHG

## 2021-08-05 VITALS — DIASTOLIC BLOOD PRESSURE: 69 MMHG | SYSTOLIC BLOOD PRESSURE: 119 MMHG

## 2021-08-05 VITALS — SYSTOLIC BLOOD PRESSURE: 127 MMHG | DIASTOLIC BLOOD PRESSURE: 71 MMHG

## 2021-08-05 VITALS — SYSTOLIC BLOOD PRESSURE: 125 MMHG | DIASTOLIC BLOOD PRESSURE: 51 MMHG

## 2021-08-05 VITALS — DIASTOLIC BLOOD PRESSURE: 57 MMHG | SYSTOLIC BLOOD PRESSURE: 127 MMHG

## 2021-08-05 LAB
BUN/CREAT SERPL: 13
CALCIUM SERPL-MCNC: 8 MG/DL (ref 8.5–10.1)
CHLORIDE SERPL-SCNC: 103 MMOL/L (ref 98–107)
CO2 SERPL-SCNC: 21 MMOL/L (ref 21–32)
CREAT SERPL-MCNC: 1.64 MG/DL (ref 0.6–1.3)
GFR SERPLBLD BASED ON 1.73 SQ M-ARVRAT: 30 ML/MIN
GLUCOSE SERPL-MCNC: 102 MG/DL (ref 70–105)
HCT VFR BLD CALC: 25 % (ref 35–52)
HGB BLD-MCNC: 8.2 G/DL (ref 11.5–16)
MCH RBC QN AUTO: 30 PG (ref 25–34)
MCHC RBC AUTO-ENTMCNC: 33 G/DL (ref 32–36)
MCV RBC AUTO: 92 FL (ref 80–99)
PLATELET # BLD: 188 10^3/UL (ref 130–400)
PMV BLD AUTO: 9.7 FL (ref 9–12.2)
POTASSIUM SERPL-SCNC: 5 MMOL/L (ref 3.6–5)
SODIUM SERPL-SCNC: 132 MMOL/L (ref 135–145)
WBC # BLD AUTO: 9.1 10^3/UL (ref 4.3–11)

## 2021-08-05 RX ADMIN — GABAPENTIN SCH MG: 400 CAPSULE ORAL at 07:56

## 2021-08-05 RX ADMIN — SODIUM CHLORIDE, SODIUM LACTATE, POTASSIUM CHLORIDE, AND CALCIUM CHLORIDE SCH MLS/HR: 600; 310; 30; 20 INJECTION, SOLUTION INTRAVENOUS at 20:45

## 2021-08-05 RX ADMIN — Medication SCH ML: at 05:49

## 2021-08-05 RX ADMIN — INSULIN ASPART SCH UNIT: 100 INJECTION, SOLUTION INTRAVENOUS; SUBCUTANEOUS at 16:17

## 2021-08-05 RX ADMIN — INSULIN ASPART SCH UNIT: 100 INJECTION, SOLUTION INTRAVENOUS; SUBCUTANEOUS at 21:00

## 2021-08-05 RX ADMIN — ALPRAZOLAM PRN MG: 0.25 TABLET ORAL at 23:21

## 2021-08-05 RX ADMIN — Medication SCH ML: at 20:36

## 2021-08-05 RX ADMIN — GABAPENTIN SCH MG: 400 CAPSULE ORAL at 20:36

## 2021-08-05 RX ADMIN — INSULIN ASPART SCH UNIT: 100 INJECTION, SOLUTION INTRAVENOUS; SUBCUTANEOUS at 06:10

## 2021-08-05 RX ADMIN — IMIPRAMINE HYDROCHLORIDE SCH MG: 25 TABLET ORAL at 20:35

## 2021-08-05 RX ADMIN — DOXAZOSIN MESYLATE SCH MG: 2 TABLET ORAL at 20:35

## 2021-08-05 RX ADMIN — INSULIN ASPART SCH UNIT: 100 INJECTION, SOLUTION INTRAVENOUS; SUBCUTANEOUS at 11:45

## 2021-08-05 RX ADMIN — SODIUM CHLORIDE, SODIUM LACTATE, POTASSIUM CHLORIDE, AND CALCIUM CHLORIDE SCH MLS/HR: 600; 310; 30; 20 INJECTION, SOLUTION INTRAVENOUS at 14:20

## 2021-08-05 RX ADMIN — Medication SCH ML: at 14:00

## 2021-08-05 RX ADMIN — PRAMOXINE HYDROCHLORIDE HYDROCORTISONE ACETATE SCH APPLIC: 100; 100 AEROSOL, FOAM TOPICAL at 09:08

## 2021-08-05 RX ADMIN — LOSARTAN POTASSIUM SCH MG: 100 TABLET, FILM COATED ORAL at 07:56

## 2021-08-05 RX ADMIN — ALPRAZOLAM PRN MG: 0.25 TABLET ORAL at 08:01

## 2021-08-05 RX ADMIN — PRAMOXINE HYDROCHLORIDE HYDROCORTISONE ACETATE SCH APPLIC: 100; 100 AEROSOL, FOAM TOPICAL at 20:35

## 2021-08-05 RX ADMIN — SODIUM CHLORIDE SCH MLS/HR: 900 INJECTION, SOLUTION INTRAVENOUS at 00:57

## 2021-08-05 RX ADMIN — SODIUM CHLORIDE, SODIUM LACTATE, POTASSIUM CHLORIDE, AND CALCIUM CHLORIDE SCH MLS/HR: 600; 310; 30; 20 INJECTION, SOLUTION INTRAVENOUS at 07:37

## 2021-08-05 RX ADMIN — PANTOPRAZOLE SODIUM SCH MG: 40 TABLET, DELAYED RELEASE ORAL at 07:55

## 2021-08-05 NOTE — PROGRESS NOTE
Subjective


Date Seen by a Provider:  Aug 5, 2021


Time Seen by a Provider:  08:25


Subjective/Events-last exam


Fwup lower GI Hemorrhage due to acute blood loss from bleeding internal 

hemorrhoids, Acute blood loss anemia, hyponatremia, chronic renal insufficiency,

DMII, GERD.  Sitting up on commode.  Has had no bleeding since procedure 

yesterday.





Objective


Exam





Vital Signs








  Date Time  Temp Pulse Resp B/P (MAP) Pulse Ox O2 Delivery O2 Flow Rate FiO2


 


8/5/21 07:37 36.0 88 18 125/51 (75) 98 Room Air  


 


8/5/21 04:15 36.6 84 16 143/60 (87) 96 Room Air  


 


8/5/21 01:00  80      


 


8/5/21 00:12 36.5 79 16 127/57 (80) 99 Room Air  


 


8/4/21 20:45      Room Air  


 


8/4/21 20:25 36.5 80 16 107/51 (69) 97 Room Air  


 


8/4/21 19:00  80      


 


8/4/21 16:10 36.4 94 16 98/45 (62) 98 Room Air  


 


8/4/21 16:00      Room Air  


 


8/4/21 15:50 37.2  14 120/40 (66) 95 Room Air  


 


8/4/21 15:45      Room Air  


 


8/4/21 15:40   14 104/43 (63) 100 Room Air  


 


8/4/21 15:30      OxyMask 2 


 


8/4/21 15:30   14 107/43 (64) 100 Room Air  


 


8/4/21 15:20   14 112/42 (65) 100 OxyMask 2 


 


8/4/21 15:15      OxyMask 2 


 


8/4/21 15:10   18 121/56 (77) 100 OxyMask 2 


 


8/4/21 15:00   15 122/41 (68) 100 OxyMask 2 


 


8/4/21 14:50      OxyMask 4 


 


8/4/21 14:50 36.8  16 141/63 (89) 100 OxyMask 4 


 


8/4/21 12:00 36.5 73 20 130/60 (83) 98 Room Air  














I & O 


 


 8/5/21





 07:00


 


Intake Total 1240 ml


 


Output Total 125 ml


 


Balance 1115 ml





Capillary Refill : Less Than 3 Seconds


General Appearance:  No Apparent Distress


Respiratory:  Lungs Clear


Cardiovascular:  Regular Rate, Rhythm


Neurologic/Psychiatric:  Alert, Oriented x3





Results


Lab


Laboratory Tests


8/4/21 10:54: Glucometer 118H


8/4/21 13:40: SARS-CoV-2 RNA (RT-PCR) Not Detected


8/4/21 16:19: Glucometer 150H


8/4/21 20:35: Glucometer 152H


8/5/21 05:17: 


White Blood Count 9.1, Red Blood Count 2.71L, Hemoglobin 8.2L, Hematocrit 25L, 

Mean Corpuscular Volume 92, Mean Corpuscular Hemoglobin 30, Mean Corpuscular 

Hemoglobin Concent 33, Red Cell Distribution Width 12.5, Platelet Count 188, 

Mean Platelet Volume 9.7, Sodium Level 132L, Potassium Level 5.0, Chloride Level

103, Carbon Dioxide Level 21, Anion Gap 8, Blood Urea Nitrogen 22H, Creatinine 

1.64H, Estimat Glomerular Filtration Rate 30, BUN/Creatinine Ratio 13, Glucose 

Level 102, Calcium Level 8.0L





Assessment/Plan


Assessment/Plan


Assess & Plan/Chief Complaint


1. Acute Lower GI Hemorrhage due to bleeding internal hemorrhoids--S/P 

destruction of internal hemorrhoidal columns x3 and removal of external 

hemorrhoids


2. Acute Blood Loss Anemia due to #1--H/H stable from yesterday at 8.2


3. Hyponatremia--improved post hypertonic saline


4. Acute on Chronic Renal Insufficiency--continue IVFs and monitor BUN/Cr


5. DMII--on accuchecks with SSI


6. GERD--back on protonix


DC plans per surgery





Clinical Quality Measures


Admission Status


Admission Dx


1.  [facility]ute Lower [facility]--still bleeding so will likely need 

intervention


2.  Acute Blood Loss Anemia--H/H has dropped to 8.3 and 25--will monitor 


3.  Hyponatremia--give a bag of hypertonic saline and repeat Na tomorrow


4.  GERD--back on protonix


5.  Chronic Renal Insufficiency--hydrating and BUN/Cr stable


6.  Diabetes mellitus--on accuchecks with SSI











KAYLEY MURRAY DO         Aug 5, 2021 08:29

## 2021-08-05 NOTE — PROGRESS NOTE - SURGERY
ESTER ELLISON 8/5/21 0745:


Subjective


Date Seen by a Provider:  Aug 5, 2021


Time Seen by a Provider:  07:10


Subjective/Events-last exam


Pt recovering from int hemroid cauterization


Pt reports no BM and hestant to do so 2/2 pain


Pt looking forward to returning home


Low urine output noted by nursing staff


Review of Systems


General:  No Chills, No Night Sweats, No Fatigue, No Malaise


HEENT:  No Head Aches, No Visual Changes


Pulmonary:  No Dyspnea, No Cough


Cardiovascular:  No: Chest Pain, Palpitations


Gastrointestinal:  Abdominal Pain; No: Nausea, Vomiting


Genitourinary:  Other (peeing less than expected)


Musculoskeletal:  No: other, neck pain, shoulder pain, arm pain, back pain, hand

pain, leg pain, foot pain


Neurological:  No: Weakness, Change in speech, Confusion


Mild abdominal discomfort





Focused Exam


Respiratory:  Lungs Clear, Normal Breath Sounds, No Accessory Muscle Use, No 

Respiratory Distress


Cardiovascular:  Regular Rate, Rhythm, No JVD, No Murmur, Normal Peripheral 

Pulses


Peripheral Pulses:  2+ Radial Pulses (R), 2+ Radial Pulses (L)


Skin:  normal color, warm/dry





Objective


Exam





Vital Signs








  Date Time  Temp Pulse Resp B/P (MAP) Pulse Ox O2 Delivery O2 Flow Rate FiO2


 


8/5/21 07:37 36.0 88 18 125/51 (75) 98 Room Air  


 


8/5/21 04:15 36.6 84 16 143/60 (87) 96 Room Air  


 


8/5/21 01:00  80      


 


8/5/21 00:12 36.5 79 16 127/57 (80) 99 Room Air  


 


8/4/21 20:45      Room Air  


 


8/4/21 20:25 36.5 80 16 107/51 (69) 97 Room Air  


 


8/4/21 19:00  80      


 


8/4/21 16:10 36.4 94 16 98/45 (62) 98 Room Air  


 


8/4/21 16:00      Room Air  


 


8/4/21 15:50 37.2  14 120/40 (66) 95 Room Air  


 


8/4/21 15:45      Room Air  


 


8/4/21 15:40   14 104/43 (63) 100 Room Air  


 


8/4/21 15:30      OxyMask 2 


 


8/4/21 15:30   14 107/43 (64) 100 Room Air  


 


8/4/21 15:20   14 112/42 (65) 100 OxyMask 2 


 


8/4/21 15:15      OxyMask 2 


 


8/4/21 15:10   18 121/56 (77) 100 OxyMask 2 


 


8/4/21 15:00   15 122/41 (68) 100 OxyMask 2 


 


8/4/21 14:50      OxyMask 4 


 


8/4/21 14:50 36.8  16 141/63 (89) 100 OxyMask 4 


 


8/4/21 12:00 36.5 73 20 130/60 (83) 98 Room Air  


 


8/4/21 08:05      Room Air  


 


8/4/21 08:00 36.2 78 20 140/64 (89) 98 Room Air  














I & O 


 


 8/5/21





 07:00


 


Intake Total 1240 ml


 


Output Total 125 ml


 


Balance 1115 ml





Capillary Refill : Less Than 3 Seconds


General Appearance:  No Apparent Distress, Obese


HEENT:  No Moist Mucous Membranes (dry and pale)


Neck:  Supple


Respiratory:  Lungs Clear, Normal Breath Sounds, No Accessory Muscle Use, No 

Respiratory Distress


Cardiovascular:  Regular Rate, Rhythm, No Murmur


Peripheral Pulses:  2+ Carotid (R), 2+ Carotid (L)


Gastrointestinal:  non tender, soft, no organomegaly, other (bleeding internal 

hemorrhoids)


Extremity:  Non Tender, No Calf Tenderness, No Pedal Edema


Neurologic/Psychiatric:  Alert, Oriented x3


Skin:  Warm/Dry





Results


Lab


Laboratory Tests


8/4/21 10:54: Glucometer 118H


8/4/21 13:40: SARS-CoV-2 RNA (RT-PCR) Not Detected


8/4/21 16:19: Glucometer 150H


8/4/21 20:35: Glucometer 152H


8/5/21 05:17: 


White Blood Count 9.1, Red Blood Count 2.71L, Hemoglobin 8.2L, Hematocrit 25L, 

Mean Corpuscular Volume 92, Mean Corpuscular Hemoglobin 30, Mean Corpuscular 

Hemoglobin Concent 33, Red Cell Distribution Width 12.5, Platelet Count 188, 

Mean Platelet Volume 9.7, Sodium Level 132L, Potassium Level 5.0, Chloride Level

103, Carbon Dioxide Level 21, Anion Gap 8, Blood Urea Nitrogen 22H, Creatinine 

1.64H, Estimat Glomerular Filtration Rate 30, BUN/Creatinine Ratio 13, Glucose 

Level 102, Calcium Level 8.0L





Assessment/Plan


bleeding from internal hemroids - status post op


Hyponatremia











post op recovery


dehydrated





Plan - increased fluid intake, montior for bleeding from surgical site, 

nutritional intake.





RAFAEL MENDEZ DO 8/5/21 0952:


Subjective


Time Seen by a Provider:  09:31


Subjective/Events-last exam


Pt seen and examined, states she is a little goofy this am (but also that this 

is mostly normal).  She is starting to drink more and did urinate some more as 

well.  No BM and no bleeding.


Review of Systems


General:  No Chills, No Night Sweats; Fatigue


Pulmonary:  No Dyspnea, No Cough


Cardiovascular:  No: Chest Pain, Palpitations


Gastrointestinal:  No: Nausea, Vomiting





Objective


Exam


General Appearance:  No Apparent Distress, Obese


HEENT:  No Moist Mucous Membranes (dry mouth)


Respiratory:  Lungs Clear, Normal Breath Sounds, No Accessory Muscle Use, No 

Respiratory Distress


Cardiovascular:  Regular Rate, Rhythm, No Murmur


Gastrointestinal:  non tender, soft, no organomegaly





Assessment/Plan


Anemia -secondary to bleeding from internal hemroids - Cautery destruction of 3 

columns yesterday


Hyponatremia - switch to LR at 150ml/hr


Dehydration


Acute Renal Failure - probably secondary to dehydration





Plan - encourage  fluid intake; but not water, Powerade would be better.  

Increased IV fluid, montior for bleeding from surgical site, nutritional intake.





Supervisory-Addendum Brief


Verification & Attestation


Participated in pt care:  history, MDM, physical


Personally performed:  exam, history, MDM, supervision of care


Care discussed with:  Medical Student


Procedures:  n/a


Verification and Attestation of Medical Student E/M Service





A medical student performed and documented this service. I then reviewed and 

verified all information documented by the medical student and made 

modifications to such information, when appropriate. I personally performed a 

physical exam, medical decision making and then discussed any differences 

between the notes and made revisions as necessary to create one note.





Rafael Mendez , 8/5/21 , 10:08











ESTER ELLISON                Aug 5, 2021 07:45


RAFAEL MENDEZ DO                Aug 5, 2021 09:52

## 2021-08-06 VITALS — SYSTOLIC BLOOD PRESSURE: 143 MMHG | DIASTOLIC BLOOD PRESSURE: 75 MMHG

## 2021-08-06 VITALS — SYSTOLIC BLOOD PRESSURE: 127 MMHG | DIASTOLIC BLOOD PRESSURE: 74 MMHG

## 2021-08-06 VITALS — DIASTOLIC BLOOD PRESSURE: 66 MMHG | SYSTOLIC BLOOD PRESSURE: 105 MMHG

## 2021-08-06 VITALS — SYSTOLIC BLOOD PRESSURE: 131 MMHG | DIASTOLIC BLOOD PRESSURE: 81 MMHG

## 2021-08-06 VITALS — DIASTOLIC BLOOD PRESSURE: 71 MMHG | SYSTOLIC BLOOD PRESSURE: 141 MMHG

## 2021-08-06 VITALS — SYSTOLIC BLOOD PRESSURE: 140 MMHG | DIASTOLIC BLOOD PRESSURE: 75 MMHG

## 2021-08-06 RX ADMIN — INSULIN ASPART SCH UNIT: 100 INJECTION, SOLUTION INTRAVENOUS; SUBCUTANEOUS at 11:24

## 2021-08-06 RX ADMIN — SODIUM CHLORIDE SCH MLS/HR: 900 INJECTION, SOLUTION INTRAVENOUS at 15:47

## 2021-08-06 RX ADMIN — DOCUSATE SODIUM SCH MG: 100 CAPSULE ORAL at 21:10

## 2021-08-06 RX ADMIN — SODIUM CHLORIDE, SODIUM LACTATE, POTASSIUM CHLORIDE, AND CALCIUM CHLORIDE SCH MLS/HR: 600; 310; 30; 20 INJECTION, SOLUTION INTRAVENOUS at 10:10

## 2021-08-06 RX ADMIN — ACETAMINOPHEN PRN MG: 325 TABLET ORAL at 13:01

## 2021-08-06 RX ADMIN — LOSARTAN POTASSIUM SCH MG: 100 TABLET, FILM COATED ORAL at 07:57

## 2021-08-06 RX ADMIN — SODIUM CHLORIDE, SODIUM LACTATE, POTASSIUM CHLORIDE, AND CALCIUM CHLORIDE SCH MLS/HR: 600; 310; 30; 20 INJECTION, SOLUTION INTRAVENOUS at 03:30

## 2021-08-06 RX ADMIN — Medication SCH ML: at 14:58

## 2021-08-06 RX ADMIN — INSULIN ASPART SCH UNIT: 100 INJECTION, SOLUTION INTRAVENOUS; SUBCUTANEOUS at 05:04

## 2021-08-06 RX ADMIN — GABAPENTIN SCH MG: 400 CAPSULE ORAL at 07:56

## 2021-08-06 RX ADMIN — INSULIN ASPART SCH UNIT: 100 INJECTION, SOLUTION INTRAVENOUS; SUBCUTANEOUS at 16:00

## 2021-08-06 RX ADMIN — PRAMOXINE HYDROCHLORIDE HYDROCORTISONE ACETATE SCH APPLIC: 100; 100 AEROSOL, FOAM TOPICAL at 07:58

## 2021-08-06 RX ADMIN — DOXAZOSIN MESYLATE SCH MG: 2 TABLET ORAL at 21:10

## 2021-08-06 RX ADMIN — Medication SCH ML: at 05:04

## 2021-08-06 RX ADMIN — GABAPENTIN SCH MG: 400 CAPSULE ORAL at 21:10

## 2021-08-06 RX ADMIN — ALPRAZOLAM PRN MG: 0.25 TABLET ORAL at 21:11

## 2021-08-06 RX ADMIN — IMIPRAMINE HYDROCHLORIDE SCH MG: 25 TABLET ORAL at 21:10

## 2021-08-06 RX ADMIN — PANTOPRAZOLE SODIUM SCH MG: 40 TABLET, DELAYED RELEASE ORAL at 07:57

## 2021-08-06 RX ADMIN — Medication SCH ML: at 22:54

## 2021-08-06 RX ADMIN — INSULIN ASPART SCH UNIT: 100 INJECTION, SOLUTION INTRAVENOUS; SUBCUTANEOUS at 21:10

## 2021-08-06 RX ADMIN — PRAMOXINE HYDROCHLORIDE HYDROCORTISONE ACETATE SCH APPLIC: 100; 100 AEROSOL, FOAM TOPICAL at 21:10

## 2021-08-06 NOTE — PROGRESS NOTE - SURGERY
NOELPARISESTER 8/6/21 0935:


Subjective


Date Seen by a Provider:  Aug 6, 2021


Time Seen by a Provider:  07:46


Subjective/Events-last exam


Pt alert and sitting in chair next to bed, nursing reports small blood clot in 

camode without active bleeding, but no BM yet.


Urine output has improved and pt reports returning clarity to speech and 

cognition.


Pt is anxious about having BM.


Review of Systems


General:  No Chills; Fatigue, Appetite


HEENT:  Head Aches; No Visual Changes, No Eye Pain, No Sinus Congestion


Pulmonary:  No Dyspnea, No Cough


Cardiovascular:  No: Chest Pain, Palpitations


Gastrointestinal:  No: Nausea, Vomiting, Abdominal Pain, Diarrhea


Genitourinary:  No Dysuria, No Frequency, No Incontinence


Musculoskeletal:  No: neck pain, arm pain, hand pain


Neurological:  Weakness, Numbness





Objective


Exam





Vital Signs








  Date Time  Temp Pulse Resp B/P (MAP) Pulse Ox O2 Delivery O2 Flow Rate FiO2


 


8/6/21 08:00 37.0 90 20 131/81 (98) 96 Room Air  


 


8/6/21 08:00     97 Room Air  


 


8/6/21 07:00  85      


 


8/6/21 03:24 36.9 89 20 143/75 (97) 97 Room Air  


 


8/6/21 01:00  86      


 


8/5/21 23:06 36.2 84 18 119/69 (86) 98 Room Air  


 


8/5/21 20:36      Room Air  


 


8/5/21 19:30 37.0 91 14 127/71 (89) 96 Room Air  


 


8/5/21 19:00  84      


 


8/5/21 16:28     98 Nasal Cannula 2.00 


 


8/5/21 15:45 35.8 86 14 101/65 (77) 98 Room Air  


 


8/5/21 12:51  81      


 


8/5/21 11:25 36.1 80 16 112/70 (84) 97 Room Air  














I & O 


 


 8/6/21





 06:59


 


Intake Total 4100 ml


 


Output Total 1220 ml


 


Balance 2880 ml





Capillary Refill : Less Than 3 Seconds


General Appearance:  No Apparent Distress


HEENT:  PERRL/EOMI, Pharynx Normal, Moist Mucous Membranes


Neck:  Non Tender, Supple; No Lymphadenopathy (L), No Lymphadenopathy (R)


Respiratory:  Lungs Clear, Normal Breath Sounds, No Accessory Muscle Use


Cardiovascular:  Regular Rate, Rhythm, No Murmur, Normal Peripheral Pulses


Peripheral Pulses:  2+ Radial Pulses (R), 2+ Radial Pulses (L)


Gastrointestinal:  non tender, soft, no organomegaly


Extremity:  Pedal Edema


Neurologic/Psychiatric:  Alert, Oriented x3, CNs II-XII Norm as Tested


Skin:  Normal Color, Warm/Dry, Pallor





Results


Lab


Laboratory Tests


8/5/21 11:24: Glucometer 153H


8/5/21 16:00: Glucometer 116H


8/5/21 21:10: Glucometer 123H


8/6/21 05:02: Glucometer 118H





Microbiology


8/4/21 MRSA Screen - Final, Complete


         MRSA not isolated





Assessment/Plan


Anemia -secondary to bleeding from internal hemroids


Hyponatremia - appears resolved


Dehydration - resolved


Acute Renal Failure - probably secondary to dehydration





Plan - monitor for bleeding from surgical site, maintain nutritional and fluid 

intake, stool softener.





JAIR MENDEZ DO 8/6/21 1558:


Subjective


Time Seen by a Provider:  15:40


Subjective/Events-last exam


Pt seen and examined, she is tolerating diet and is mainly mad because she 

doesn't get to go home today.


Review of Systems


General:  No Chills; Fatigue


Cardiovascular:  No: Chest Pain, Palpitations


Gastrointestinal:  No: Nausea, Vomiting, Abdominal Pain


Genitourinary:  No Dysuria, No Frequency


Neurological:  Confusion (very mild)





Objective


Exam


General Appearance:  No Apparent Distress


HEENT:  Moist Mucous Membranes


Respiratory:  Lungs Clear, Normal Breath Sounds, No Accessory Muscle Use


Cardiovascular:  Regular Rate, Rhythm, No Murmur


Gastrointestinal:  non tender, soft


Skin:  Pallor





Assessment/Plan


Anemia -secondary to bleeding from internal hemroids


Hyponatremia - appears resolved


Dehydration - resolved


Acute Renal Failure - probably secondary to dehydration





Plan - monitor for bleeding from surgical site, maintain nutritional and fluid 

intake, stool softener.  Will check labs tomorrow and probably ok to go home if 

no big changes.





Supervisory-Addendum Brief


Verification & Attestation


Participated in pt care:  history, MDM, physical


Personally performed:  exam, history, MDM, supervision of care


Care discussed with:  Medical Student


Procedures:  n/a


Verification and Attestation of Medical Student E/M Service





A medical student performed and documented this service. I then reviewed and 

verified all information documented by the medical student and made modification

s to such information, when appropriate. I personally performed a physical exam,

medical decision making and then discussed any differences between the notes and

made revisions as necessary to create one note.





Jair Mendez , 8/6/21 , 15:58











ESTER ELLISON                Aug 6, 2021 09:35


JAIR MENDEZ DO                Aug 6, 2021 15:58

## 2021-08-06 NOTE — PROGRESS NOTE - HOSPITALIST
ASIYA HARRIS MED STUDENT 8/6/21 0847:


Subjective


HPI/CC On Admission


Date Seen by Provider:  Aug 6, 2021


Time Seen by Provider:  08:15


Pt is up in chair this morning. Has not had BM yet, but confirms bleeding per 

rectum. Bleeding has improved since yesterday. Pt is tolerating food and has no 

abdominal pain. Pain in rectum is 7/10.





Objective


Exam


Vital Signs





Vital Signs








  Date Time  Temp Pulse Resp B/P (MAP) Pulse Ox O2 Delivery O2 Flow Rate FiO2


 


8/6/21 08:00 37.0 90 20 131/81 (98) 96 Room Air  


 


8/5/21 16:28       2.00 





Capillary Refill : Less Than 3 Seconds


General Appearance:  No Apparent Distress, Obese


Respiratory:  Lungs Clear, Normal Breath Sounds, No Accessory Muscle Use


Cardiovascular:  Regular Rate, Rhythm, No Murmur


Gastrointestinal:  Normal Bowel Sounds, Non Tender, Soft


Extremity:  Pedal Edema


Neurologic/Psychiatric:  Alert


Skin:  Pallor





Results/Procedures


Lab


Patient resulted labs reviewed.





Assessment/Plan


Assessment and Plan


Assess & Plan/Chief Complaint


Acute blood loss anemia d/t internal and external hemorrhoids


Status post hemorrhoidectomy POD 2


Chronic Constipation


Diabetes


HTN


Obesity


Peripheral Neuropathy


Gait instability


Hyponatremic


Acute on Chronic Kidney Disease








Acute blood loss anemia d/t internal and external hemorrhoids


   -Hbg stabilized at 8.2.


Status post hemorrhoidectomy POD 2


   -Continue use of SCDs and ambulation for DVT prophylaxis, as pt is not 

candidate for Lovanox d/t acute bleed.


   -Will continue to monitor pt as she has not had BM.


Chronic Constipation


   -Start Miralax.


   -Give one dose of Relistor.


Diabetes


   -Continue to home insulin regimen. Encouraged higher protein diet. 


HTN


   -Will continue to monitor


Obesity


   -Encouraged dietary changes


Peripheral Neuropathy


   -Will continue to manage diabetes


Gait instability


   -Encouraged pt to use walker at hospital and upon d/c to home.


   -Discussed use of anti-slip socks at home to help stabilize pt when using 

walker as her feet sometimes slip d/t neuropathy.


Hyponatremic


   -Improving, Will continue to monitor.


Acute on Chronic Kidney Disease


   -Will continue to monitor





STEFFEN EDMONDS MD 8/6/21 2222:


Subjective


HPI/CC On Admission


PT IS A CLINIC PT OF DR MURRAY FOR WHOM I AM ON CALL. PT IS STATUS POST GI 

BLEED FROM HEMORRHOIDS WITH HEMORRHOIDECTOMY. THE PT REPORTS THAT SHE IS FEELING

BETTER, HAS RECTAL DISCOMFORT, PASSING SOME GAS BUT HAS NOT PASSED STOOL.  SHE 

CONTINUES TO HAVE SOME BLOOD PER RECTUM.  SHE DOES NOT HAVE A GREAT APPETITE PER

STAFF REPORT.  PTS  REPORTS SHE PREFERS FAST FOOD.  PTS URINE OUTPUT HAS 

IMPROVED OVERNIGHT PER STAFF REPORT.





Review of Systems


General:  No Chills, No Night Sweats, No Fatigue, No Malaise


Pulmonary:  No Dyspnea, No Cough, No Pleuritic Chest Pain, No Other


Cardiovascular:  Edema; No: Chest Pain, Palpitations, Orthopnea, Lt Headedness


Gastrointestinal:  Constipation, Hematochezia


Neurological:  Weakness, Numbness





Objective


Exam


General Appearance:  No Apparent Distress, WD/WN, Obese, Other


HEENT:  PERRL/EOMI, Pharynx Normal


Neck:  Full Range of Motion, Non Tender, Supple


Respiratory:  Chest Non Tender, Lungs Clear, Normal Breath Sounds, No Accessory 

Muscle Use, No Respiratory Distress


Cardiovascular:  Regular Rate, Rhythm, No JVD, No Murmur


Gastrointestinal:  Normal Bowel Sounds, No Organomegaly, No Pulsatile Mass, Non 

Tender, Soft


Rectal:  Deferred


Extremity:  Normal Capillary Refill, No Calf Tenderness


Neurologic/Psychiatric:  Alert, Oriented x3, No Motor/Sensory Deficits, Normal 

Mood/Affect, CNs II-XII Norm as Tested


Skin:  Normal Color, Warm/Dry


Lymphatic:  No Adenopathy





Assessment/Plan


Assessment and Plan


Assess & Plan/Chief Complaint


ACUTE ANEMIA SECONDARY TO RECTAL BLEED


POST OP HEMORRHOIDECTOMY


ACUTE ON CHRONIC RENAL FAILURE


CONSTIPATION


DIABETES MELLITUS


HYPERTENSION


PERIPHERAL NEUROPATHY


WEAKNESS


HYPONATREMIA





TREAT CONSTIPATION WITH RELISTOR AND MIRALAX, monitor output


REPEAT LABS IN MORNING


AGREE WITH MED STUDENT NOTE AS DOCUMENTED.





Supervisory-Addendum Brief


Verification & Attestation


Participated in pt care:  history, MDM, physical


Personally performed:  exam, history, MDM, supervision of care


Care discussed with:  Medical Student


Procedures:  n/a


Results interpretation:  Verified all documentation


I AGREE WITH STUDENT NOTE AS DOCUMENTED.  I PERSONALLY REVIEWED THE PT CHART, 

INTERVIEWED THE PATIENT AND HER SPOUSE, EXAMINED THE PATIENT, DISCUSSED THE PLAN

OF CARE WITH THE PT, HER , NURSING STAFF AND DR WRIGHT.  


PLAN WILL HOPEFULLY BE FOR DISCHARGE OF THE PATIENT TO HOME TOMORROW, PENDING 

LAB REPORT AND GI SYMPTOMS.











ASIYA HARRIS STUDENT     Aug 6, 2021 08:47


STEFFEN EDMONDS MD             Aug 6, 2021 22:22

## 2021-08-07 VITALS — DIASTOLIC BLOOD PRESSURE: 74 MMHG | SYSTOLIC BLOOD PRESSURE: 177 MMHG

## 2021-08-07 VITALS — DIASTOLIC BLOOD PRESSURE: 65 MMHG | SYSTOLIC BLOOD PRESSURE: 149 MMHG

## 2021-08-07 VITALS — SYSTOLIC BLOOD PRESSURE: 141 MMHG | DIASTOLIC BLOOD PRESSURE: 62 MMHG

## 2021-08-07 VITALS — DIASTOLIC BLOOD PRESSURE: 67 MMHG | SYSTOLIC BLOOD PRESSURE: 144 MMHG

## 2021-08-07 VITALS — SYSTOLIC BLOOD PRESSURE: 192 MMHG | DIASTOLIC BLOOD PRESSURE: 78 MMHG

## 2021-08-07 VITALS — DIASTOLIC BLOOD PRESSURE: 79 MMHG | SYSTOLIC BLOOD PRESSURE: 187 MMHG

## 2021-08-07 VITALS — SYSTOLIC BLOOD PRESSURE: 160 MMHG | DIASTOLIC BLOOD PRESSURE: 68 MMHG

## 2021-08-07 VITALS — DIASTOLIC BLOOD PRESSURE: 72 MMHG | SYSTOLIC BLOOD PRESSURE: 129 MMHG

## 2021-08-07 LAB
ALBUMIN SERPL-MCNC: 2.8 GM/DL (ref 3.2–4.5)
ALP SERPL-CCNC: 51 U/L (ref 40–136)
ALT SERPL-CCNC: 19 U/L (ref 0–55)
BASOPHILS # BLD AUTO: 0 10^3/UL (ref 0–0.1)
BASOPHILS NFR BLD AUTO: 0 % (ref 0–10)
BILIRUB SERPL-MCNC: 0.2 MG/DL (ref 0.1–1)
BUN/CREAT SERPL: 12
CALCIUM SERPL-MCNC: 7.5 MG/DL (ref 8.5–10.1)
CHLORIDE SERPL-SCNC: 104 MMOL/L (ref 98–107)
CO2 SERPL-SCNC: 20 MMOL/L (ref 21–32)
CREAT SERPL-MCNC: 1.13 MG/DL (ref 0.6–1.3)
EOSINOPHIL # BLD AUTO: 0.2 10^3/UL (ref 0–0.3)
EOSINOPHIL NFR BLD AUTO: 2 % (ref 0–10)
GFR SERPLBLD BASED ON 1.73 SQ M-ARVRAT: 47 ML/MIN
GLUCOSE SERPL-MCNC: 125 MG/DL (ref 70–105)
HCT VFR BLD CALC: 20 % (ref 35–52)
HCT VFR BLD CALC: 25 % (ref 35–52)
HGB BLD-MCNC: 6.7 G/DL (ref 11.5–16)
HGB BLD-MCNC: 8.4 G/DL (ref 11.5–16)
LYMPHOCYTES # BLD AUTO: 1.2 10^3/UL (ref 1–4)
LYMPHOCYTES NFR BLD AUTO: 18 % (ref 12–44)
MANUAL DIFFERENTIAL PERFORMED BLD QL: NO
MCH RBC QN AUTO: 30 PG (ref 25–34)
MCHC RBC AUTO-ENTMCNC: 33 G/DL (ref 32–36)
MCV RBC AUTO: 91 FL (ref 80–99)
MONOCYTES # BLD AUTO: 0.5 10^3/UL (ref 0–1)
MONOCYTES NFR BLD AUTO: 7 % (ref 0–12)
NEUTROPHILS # BLD AUTO: 4.8 10^3/UL (ref 1.8–7.8)
NEUTROPHILS NFR BLD AUTO: 71 % (ref 42–75)
PLATELET # BLD: 181 10^3/UL (ref 130–400)
PMV BLD AUTO: 9.7 FL (ref 9–12.2)
POTASSIUM SERPL-SCNC: 4.5 MMOL/L (ref 3.6–5)
PROT SERPL-MCNC: 4.8 GM/DL (ref 6.4–8.2)
SODIUM SERPL-SCNC: 133 MMOL/L (ref 135–145)
WBC # BLD AUTO: 6.8 10^3/UL (ref 4.3–11)

## 2021-08-07 RX ADMIN — PRAMOXINE HYDROCHLORIDE HYDROCORTISONE ACETATE SCH APPLIC: 100; 100 AEROSOL, FOAM TOPICAL at 12:57

## 2021-08-07 RX ADMIN — INSULIN ASPART SCH UNIT: 100 INJECTION, SOLUTION INTRAVENOUS; SUBCUTANEOUS at 12:44

## 2021-08-07 RX ADMIN — DOCUSATE SODIUM SCH MG: 100 CAPSULE ORAL at 08:57

## 2021-08-07 RX ADMIN — Medication SCH ML: at 06:50

## 2021-08-07 RX ADMIN — INSULIN ASPART SCH UNIT: 100 INJECTION, SOLUTION INTRAVENOUS; SUBCUTANEOUS at 06:50

## 2021-08-07 RX ADMIN — DOCUSATE SODIUM SCH MG: 100 CAPSULE ORAL at 21:13

## 2021-08-07 RX ADMIN — IMIPRAMINE HYDROCHLORIDE SCH MG: 25 TABLET ORAL at 21:13

## 2021-08-07 RX ADMIN — SODIUM CHLORIDE SCH MLS/HR: 900 INJECTION, SOLUTION INTRAVENOUS at 21:13

## 2021-08-07 RX ADMIN — SODIUM CHLORIDE SCH MLS/HR: 900 INJECTION, SOLUTION INTRAVENOUS at 05:40

## 2021-08-07 RX ADMIN — ALPRAZOLAM PRN MG: 0.25 TABLET ORAL at 21:13

## 2021-08-07 RX ADMIN — DOXAZOSIN MESYLATE SCH MG: 2 TABLET ORAL at 21:13

## 2021-08-07 RX ADMIN — INSULIN ASPART SCH UNIT: 100 INJECTION, SOLUTION INTRAVENOUS; SUBCUTANEOUS at 16:38

## 2021-08-07 RX ADMIN — INSULIN ASPART SCH UNIT: 100 INJECTION, SOLUTION INTRAVENOUS; SUBCUTANEOUS at 21:14

## 2021-08-07 RX ADMIN — Medication SCH ML: at 21:14

## 2021-08-07 RX ADMIN — Medication SCH ML: at 13:03

## 2021-08-07 RX ADMIN — LOSARTAN POTASSIUM SCH MG: 100 TABLET, FILM COATED ORAL at 08:57

## 2021-08-07 RX ADMIN — GABAPENTIN SCH MG: 400 CAPSULE ORAL at 08:57

## 2021-08-07 RX ADMIN — PANTOPRAZOLE SODIUM SCH MG: 40 TABLET, DELAYED RELEASE ORAL at 08:57

## 2021-08-07 RX ADMIN — GABAPENTIN SCH MG: 400 CAPSULE ORAL at 21:13

## 2021-08-07 RX ADMIN — PRAMOXINE HYDROCHLORIDE HYDROCORTISONE ACETATE SCH APPLIC: 100; 100 AEROSOL, FOAM TOPICAL at 21:14

## 2021-08-07 NOTE — PROGRESS NOTE - SURGERY
ESTER ELLISON 8/7/21 0850:


Subjective


Date Seen by a Provider:  Aug 7, 2021


Time Seen by a Provider:  06:36


Subjective/Events-last exam


PT recovering from anemia and cauterization of bleeding internal hemroids


Small amounts of bright red blood continue to be present according to nursing.


No BM to date.  PT is eating and drinking sodium rich fluids.


Review of Systems


General:  No Chills, No Night Sweats; Fatigue


HEENT:  No Head Aches, No Visual Changes, No Eye Pain


Pulmonary:  No Dyspnea, No Cough


Cardiovascular:  No: Chest Pain, Palpitations


Gastrointestinal:  No: Nausea, Vomiting, Abdominal Pain, Diarrhea, Constipation


Genitourinary:  No Dysuria, No Frequency


Musculoskeletal:  No: other, neck pain, shoulder pain, arm pain, back pain, hand

pain, leg pain, foot pain


Neurological:  Weakness, Numbness





Objective


Exam





Vital Signs








  Date Time  Temp Pulse Resp B/P (MAP) Pulse Ox O2 Delivery O2 Flow Rate FiO2


 


8/7/21 07:15 36.5 85 16 141/62 (88) 98 Room Air  


 


8/7/21 03:10 36.8 87 18 129/72 (91) 97 Room Air  


 


8/6/21 23:11 36.4 90 18 141/71 (94) 98 Room Air  


 


8/6/21 20:24 36.7 89 20 140/75 (96) 99 Room Air  


 


8/6/21 20:15      Room Air  


 


8/6/21 16:09 36.6 80 16 105/66 (79) 98 Room Air  


 


8/6/21 11:59 36.5 85 20 127/74 (91) 100 Room Air  














I & O 


 


 8/7/21





 07:00


 


Intake Total 1090 ml


 


Output Total 1900 ml


 


Balance -810 ml





Capillary Refill : Less Than 3 Seconds


General Appearance:  No Apparent Distress, Obese


HEENT:  PERRL/EOMI, Pharynx Normal, Moist Mucous Membranes


Neck:  Full Range of Motion, Non Tender, Supple


Respiratory:  Lungs Clear, Normal Breath Sounds, No Accessory Muscle Use


Cardiovascular:  Regular Rate, Rhythm, No Gallop, No Murmur, Normal Peripheral 

Pulses


Peripheral Pulses:  2+ Radial Pulses (R), 2+ Radial Pulses (L)


Gastrointestinal:  non tender, soft


Extremity:  Pedal Edema


Neurologic/Psychiatric:  Alert, Oriented x3, No Motor/Sensory Deficits, Normal 

Mood/Affect, CNs II-XII Norm as Tested


Skin:  Normal Color, Warm/Dry


Lymphatic:  No Adenopathy





Results


Lab


Laboratory Tests


8/6/21 11:21: Glucometer 150H


8/6/21 15:39: Glucometer 144H


8/6/21 20:13: Glucometer 148H


8/7/21 06:05: 


White Blood Count 6.8, Red Blood Count 2.20L, Hemoglobin 6.7*L, Hematocrit 20*L,

Mean Corpuscular Volume 91, Mean Corpuscular Hemoglobin 30, Mean Corpuscular 

Hemoglobin Concent 33, Red Cell Distribution Width 12.6, Platelet Count 181, 

Mean Platelet Volume 9.7, Immature Granulocyte % (Auto) 1, Neutrophils (%) 

(Auto) 71, Lymphocytes (%) (Auto) 18, Monocytes (%) (Auto) 7, Eosinophils (%) 

(Auto) 2, Basophils (%) (Auto) 0, Neutrophils # (Auto) 4.8, Lymphocytes # (Auto)

1.2, Monocytes # (Auto) 0.5, Eosinophils # (Auto) 0.2, Basophils # (Auto) 0.0, 

Immature Granulocyte # (Auto) 0.1, Sodium Level 133L, Potassium Level 4.5, 

Chloride Level 104, Carbon Dioxide Level 20L, Anion Gap 9, Blood Urea Nitrogen 

13, Creatinine 1.13, Estimat Glomerular Filtration Rate 47, BUN/Creatinine Ratio

12, Glucose Level 125H, Calcium Level 7.5L, Corrected Calcium 8.5, Total 

Bilirubin 0.2, Aspartate Amino Transf (AST/SGOT) 23, Alanine Aminotransferase 

(ALT/SGPT) 19, Alkaline Phosphatase 51, Total Protein 4.8L, Albumin 2.8L





Microbiology


8/4/21 MRSA Screen - Final, Complete


         MRSA not isolated





Assessment/Plan


Anemia -secondary to bleeding from internal hemroids


Hyponatremia - appears resolved


Dehydration - resolved


Acute Renal Failure - probably secondary to dehydration





Plan - give blood, monitor for bleeding from surgical site, maintain nutritional

and fluid intake, stool softener.





RAFAEL MENDEZ DO 8/7/21 1207:


Subjective


Time Seen by a Provider:  11:28


Subjective/Events-last exam


Pt seen and examined, actually sitting on bedside commode; still no BM.  Scant 

blood on [place] in bed.  She is eating and drinking without difficulty.  Denies 

weakness, but states she can't walk


much because her feet are numb.


Review of Systems


General:  Fatigue


HEENT:  No Head Aches


Pulmonary:  No Dyspnea, No Cough


Cardiovascular:  No: Chest Pain, Palpitations


Gastrointestinal:  Constipation; No: Nausea, Vomiting, Abdominal Pain





Objective


Exam


General Appearance:  No Apparent Distress, Obese


HEENT:  PERRL/EOMI, Moist Mucous Membranes


Respiratory:  Lungs Clear, Normal Breath Sounds, No Accessory Muscle Use


Cardiovascular:  Regular Rate, Rhythm, No Murmur


Gastrointestinal:  non tender, soft, other (scant blood from rectum)


Extremity:  Pedal Edema


Neurologic/Psychiatric:  Alert, Oriented x3





Assessment/Plan


Anemia -secondary to bleeding from internal hemorrhoids


Hyponatremia - mild, continue IV fluids


Dehydration - resolved


Acute Renal Failure - probably secondary to dehydration





Pt's hemoglobin dropped to 6.7 this am; plan - give blood, monitor for bleeding 

from surgical site, maintain nutritional and fluid intake, stool softener.  Will

also give pt proctofoam HC to


help calm down the area.  I don't think she bled that much more, more likely the

6.7 was from the initial blood loss and now increased IV fluids.  Will keep one 

more day.





Supervisory-Addendum Brief


Verification & Attestation


Participated in pt care:  history, MDM, physical


Personally performed:  exam, history, MDM, supervision of care


Care discussed with:  Medical Student


Procedures:  n/a


Verification and Attestation of Medical Student E/M Service





A medical student performed and documented this service. I then reviewed and 

verified all information documented by the medical student and made 

modifications to such information, when appropriate. I personally performed a 

physical exam, medical decision making and then discussed any differences 

between the notes and made revisions as necessary to create one note.





Rafael Mendez , 8/7/21 , 12:07











ESTER ELLISON                Aug 7, 2021 08:50


RAFAEL MENDEZ DO                Aug 7, 2021 12:07

## 2021-08-07 NOTE — PROGRESS NOTE - HOSPITALIST
ASIYA HARRIS MED STUDENT 8/7/21 0830:


Subjective


HPI/CC On Admission


Date Seen by Provider:  Aug 7, 2021


Time Seen by Provider:  08:15


PT IS A CLINIC PT OF DR MURRAY FOR WHOM I AM ON CALL. PT IS STATUS POST GI 

BLEED FROM HEMORRHOIDS WITH HEMORRHOIDECTOMY. THE PT REPORTS THAT SHE IS FEELING

BETTER, HAS RECTAL DISCOMFORT, PASSING SOME GAS BUT HAS NOT PASSED STOOL.  SHE 

CONTINUES TO HAVE SOME BLOOD PER RECTUM, BUT THIS IS IMPROVING.





Review of Systems


Pulmonary:  Cough (Non productive)


Gastrointestinal:  Constipation, Hematochezia


Neurological:  Numbness (lower extremity numbness d/t neuropathy)





Objective


Exam


Vital Signs





Vital Signs








  Date Time  Temp Pulse Resp B/P (MAP) Pulse Ox O2 Delivery O2 Flow Rate FiO2


 


8/7/21 07:15 36.5 85 16 141/62 (88) 98 Room Air  


 


8/5/21 16:28       2.00 





Capillary Refill : Less Than 3 Seconds


General Appearance:  No Apparent Distress, Obese


Respiratory:  No Accessory Muscle Use, No Respiratory Distress, Crackles


Cardiovascular:  Regular Rate, Rhythm


Gastrointestinal:  Normal Bowel Sounds, Non Tender, Soft


Extremity:  Pedal Edema


Neurologic/Psychiatric:  Alert, Oriented x3





Results/Procedures


Lab


Laboratory Tests


8/7/21 06:05








Patient resulted labs reviewed.





Assessment/Plan


Assessment and Plan


Assess & Plan/Chief Complaint


Acute blood loss anemia d/t internal and external hemorrhoids


Status post hemorrhoidectomy POD 2


Chronic Constipation


Diabetes


HTN


Obesity


Peripheral Neuropathy


Gait instability


Hyponatremic


Acute on Chronic Kidney Disease








Acute blood loss anemia d/t internal and external hemorrhoids


   -H/H dropped to 6.7 and 20.


   -Blood transfusion will be done today.


Status post hemorrhoidectomy POD 2


   -Continue use of SCDs and ambulation for DVT prophylaxis, as pt is not 

candidate for Lovanox d/t acute bleed.


   -Will continue to monitor pt as she has not had BM.


Chronic Constipation


   -Continue Miralax.


Diabetes


   -Continue to home insulin regimen. Encouraged higher protein diet. 


HTN


   -Will continue to monitor


Obesity


   -Encouraged dietary changes


Peripheral Neuropathy


   -Will continue to manage diabetes.


Gait instability


   -Encouraged pt to use walker at hospital and upon d/c to home.


   -Discussed use of anti-slip socks at home to help stabilize pt when using 

walker as her feet sometimes slip d/t neuropathy.


Hyponatremic


   -Improving, Will continue to monitor.


Acute on Chronic Kidney Disease


   -Improving, will continue to monitor


Hypocalcemia


   -Will continue to monitor


Hypoalbuminemia/Edema


   -Encouraged high protein diet.


   -Start Lasix today.





STEFFEN EDMONDS MD 8/7/21 1500:


Subjective


Subjective/Events-last exam


PT REFUSED BLOOD TRANSFUSION THIS MORNING UNTIL DISCUSSED WITH THIS PROVIDER.   

SHE STATES THAT SHE DOES NOT HAVE ANY ABDOMINAL PAIN, HAS NOT PASSED ANY STOOL. 

SHE REPORTS THAT THIS MORNING WHEN URINATING SHE FELT THE URGE, BUT "NOTHING 

CAME OUT".


Review of Systems


General:  Fatigue; No Malaise


HEENT:  No Head Aches, No Visual Changes


Pulmonary:  No Dyspnea


Cardiovascular:  Edema (ANKLES AND HANDS); No: Chest Pain, Palpitations


Gastrointestinal:  Constipation, Hematochezia


Genitourinary:  Frequency


Neurological:  Weakness, Numbness (lower extremity numbness d/t neuropathy); No:

Confusion





Objective


Exam


General Appearance:  No Apparent Distress, WD/WN, Obese


HEENT:  PERRL/EOMI


Neck:  Non Tender, Supple


Respiratory:  Chest Non Tender, No Accessory Muscle Use, No Respiratory 

Distress, Crackles (IN BASES)


Cardiovascular:  Regular Rate, Rhythm


Gastrointestinal:  Normal Bowel Sounds, No Organomegaly, Non Tender, Soft


Rectal:  Deferred


Extremity:  Normal Capillary Refill, Non Tender, No Calf Tenderness, Pedal Edema


Neurologic/Psychiatric:  Alert, Oriented x3, Normal Mood/Affect


Skin:  Normal Color, Warm/Dry


Lymphatic:  No Adenopathy





Results/Procedures


Imaging:  Reviewed Imaging Films





Assessment/Plan


Assessment and Plan


Assess & Plan/Chief Complaint


ACUTE ANEMIA SECONDARY TO RECTAL BLEED


   - WORSENING ANEMIA - HGB DROPPED FROM 8.2 TO 6.7 TODAY - BLOOD TRANSFUSION 

ORDERED, WILL REPEAT H AND H A FEW HOURS POST TRANSFUSION TO SEE IF ANOTHER UNIT

IS NEEDED.


   - IRON PANEL ORDERED AS WELL





POST OP HEMORRHOIDECTOMY


    - DEFER TO DR. WRIGHT


   - PT TO HAVE PROCTOFOAM TO HELP DECREASE RECTAL EDEMA AND HOPEFULLY HELP 

PROPAGATE BOWEL MOVEMENT.





ACUTE ON CHRONIC RENAL FAILURE


   - MONITOR LABS, RENALLY ADJUST MEDICATIONS





CONSTIPATION


   - MIRALAX AND COLACE AND MONITOR OUTPUT





DIABETES MELLITUS


   - SUPPORTIVE CARE, MONITOR FSBS





HYPERTENSION


   - MONITOR BP





PERIPHERAL NEUROPATHY


   - HOME REGIMEN RESUMED





WEAKNESS


   - WILL NEED THERAPY, CONTINUE WITH NURSING STAFF AMBULATING PT TID





HYPONATREMIA


   - MONITOR LABS, IV FLUIDS





EDEMA 


   - IV LASIX - LOW DOSE TODAY AND WILL REPEAT IF NEEDED, IV FLUIDS DECREASED 

YESTERDAY





Supervisory-Addendum Brief


Verification & Attestation


Participated in pt care:  history, MDM, physical


Personally performed:  exam, history, MDM, supervision of care


Care discussed with:  Medical Student


Procedures:  n/a


Results interpretation:  Verified all documentation


I HAVE PERSONALLY INTERVIEWED PT, EXAMINED PT, REVIEWED DATA OF LAB AND 

DISCUSSED CASE WITH PT AND HER  AS WELL AS WITH CONSULTING SURGEON.


SEE MY DOCUMENTATION IN ASSESSMENT AND PLAN.





I AGREE WITH STUDENT DOCUMENTATION.











ASIYA HARRIS MED STUDENT     Aug 7, 2021 08:30


STEFFEN EDMONDS MD             Aug 7, 2021 15:00

## 2021-08-08 VITALS — DIASTOLIC BLOOD PRESSURE: 63 MMHG | SYSTOLIC BLOOD PRESSURE: 142 MMHG

## 2021-08-08 VITALS — SYSTOLIC BLOOD PRESSURE: 172 MMHG | DIASTOLIC BLOOD PRESSURE: 84 MMHG

## 2021-08-08 VITALS — SYSTOLIC BLOOD PRESSURE: 185 MMHG | DIASTOLIC BLOOD PRESSURE: 66 MMHG

## 2021-08-08 VITALS — SYSTOLIC BLOOD PRESSURE: 138 MMHG | DIASTOLIC BLOOD PRESSURE: 63 MMHG

## 2021-08-08 VITALS — DIASTOLIC BLOOD PRESSURE: 64 MMHG | SYSTOLIC BLOOD PRESSURE: 149 MMHG

## 2021-08-08 VITALS — DIASTOLIC BLOOD PRESSURE: 73 MMHG | SYSTOLIC BLOOD PRESSURE: 194 MMHG

## 2021-08-08 VITALS — SYSTOLIC BLOOD PRESSURE: 184 MMHG | DIASTOLIC BLOOD PRESSURE: 74 MMHG

## 2021-08-08 LAB
APTT PPP: YELLOW S
BACTERIA #/AREA URNS HPF: (no result) /HPF
BILIRUB UR QL STRIP: NEGATIVE
BUN/CREAT SERPL: 5
CALCIUM SERPL-MCNC: 8.2 MG/DL (ref 8.5–10.1)
CHLORIDE SERPL-SCNC: 104 MMOL/L (ref 98–107)
CO2 SERPL-SCNC: 21 MMOL/L (ref 21–32)
CREAT SERPL-MCNC: 1.99 MG/DL (ref 0.6–1.3)
FIBRINOGEN PPP-MCNC: (no result) MG/DL
GFR SERPLBLD BASED ON 1.73 SQ M-ARVRAT: 24 ML/MIN
GLUCOSE SERPL-MCNC: 103 MG/DL (ref 70–105)
GLUCOSE UR STRIP-MCNC: NEGATIVE MG/DL
HCT VFR BLD CALC: 25 % (ref 35–52)
HGB BLD-MCNC: 8.8 G/DL (ref 11.5–16)
KETONES UR QL STRIP: NEGATIVE
LEUKOCYTE ESTERASE UR QL STRIP: (no result)
MAGNESIUM SERPL-MCNC: 1.4 MG/DL (ref 1.6–2.4)
NITRITE UR QL STRIP: NEGATIVE
PH UR STRIP: 5.5 [PH] (ref 5–9)
POTASSIUM SERPL-SCNC: 4.1 MMOL/L (ref 3.6–5)
PROT UR QL STRIP: NEGATIVE
RBC #/AREA URNS HPF: (no result) /HPF
SODIUM SERPL-SCNC: 136 MMOL/L (ref 135–145)
SP GR UR STRIP: 1.01 (ref 1.02–1.02)
SQUAMOUS #/AREA URNS HPF: (no result) /HPF
WBC #/AREA URNS HPF: (no result) /HPF

## 2021-08-08 RX ADMIN — PANTOPRAZOLE SODIUM SCH MG: 40 TABLET, DELAYED RELEASE ORAL at 08:19

## 2021-08-08 RX ADMIN — DOCUSATE SODIUM SCH MG: 100 CAPSULE ORAL at 21:02

## 2021-08-08 RX ADMIN — LOSARTAN POTASSIUM SCH MG: 100 TABLET, FILM COATED ORAL at 08:19

## 2021-08-08 RX ADMIN — IMIPRAMINE HYDROCHLORIDE SCH MG: 25 TABLET ORAL at 21:03

## 2021-08-08 RX ADMIN — INSULIN ASPART SCH UNIT: 100 INJECTION, SOLUTION INTRAVENOUS; SUBCUTANEOUS at 11:58

## 2021-08-08 RX ADMIN — Medication SCH ML: at 21:03

## 2021-08-08 RX ADMIN — INSULIN ASPART SCH UNIT: 100 INJECTION, SOLUTION INTRAVENOUS; SUBCUTANEOUS at 16:09

## 2021-08-08 RX ADMIN — ALPRAZOLAM PRN MG: 0.25 TABLET ORAL at 16:53

## 2021-08-08 RX ADMIN — DOXAZOSIN MESYLATE SCH MG: 2 TABLET ORAL at 21:02

## 2021-08-08 RX ADMIN — ALPRAZOLAM SCH MG: 0.25 TABLET ORAL at 21:02

## 2021-08-08 RX ADMIN — GABAPENTIN SCH MG: 400 CAPSULE ORAL at 08:19

## 2021-08-08 RX ADMIN — ACETAMINOPHEN PRN MG: 325 TABLET ORAL at 18:29

## 2021-08-08 RX ADMIN — Medication SCH ML: at 05:24

## 2021-08-08 RX ADMIN — PRAMOXINE HYDROCHLORIDE HYDROCORTISONE ACETATE SCH APPLIC: 100; 100 AEROSOL, FOAM TOPICAL at 21:03

## 2021-08-08 RX ADMIN — DOCUSATE SODIUM SCH MG: 100 CAPSULE ORAL at 08:18

## 2021-08-08 RX ADMIN — PRAMOXINE HYDROCHLORIDE HYDROCORTISONE ACETATE SCH APPLIC: 100; 100 AEROSOL, FOAM TOPICAL at 08:19

## 2021-08-08 RX ADMIN — SODIUM CHLORIDE SCH MLS/HR: 900 INJECTION, SOLUTION INTRAVENOUS at 05:24

## 2021-08-08 RX ADMIN — ALPRAZOLAM SCH MG: 0.25 TABLET ORAL at 10:40

## 2021-08-08 RX ADMIN — GABAPENTIN SCH MG: 400 CAPSULE ORAL at 21:02

## 2021-08-08 RX ADMIN — INSULIN ASPART SCH UNIT: 100 INJECTION, SOLUTION INTRAVENOUS; SUBCUTANEOUS at 06:49

## 2021-08-08 RX ADMIN — Medication SCH ML: at 13:00

## 2021-08-08 RX ADMIN — INSULIN ASPART SCH UNIT: 100 INJECTION, SOLUTION INTRAVENOUS; SUBCUTANEOUS at 21:03

## 2021-08-08 NOTE — PROGRESS NOTE - SURGERY
TERRAESTER 8/8/21 0837:


Subjective


Date Seen by a Provider:  Aug 8, 2021


Time Seen by a Provider:  08:16


Subjective/Events-last exam


PT recovering from anemia and cauterization of bleeding internal hemroids


Small amounts of bright red blood continue to be present according to nursing.


Two BM to last night without blood.  Hb has improved from 6.8 to 8.8 today with 

one unit of blood given.


Also hyponatremia has resolved.


Review of Systems


General:  No Chills, No Night Sweats; Fatigue, Appetite


HEENT:  No Head Aches, No Visual Changes, No Eye Pain, No Ear Pain, No Sinus 

Congestion


Pulmonary:  No Dyspnea, No Cough, No Pleuritic Chest Pain


Cardiovascular:  Edema; No: Chest Pain, Palpitations


Gastrointestinal:  No: Nausea, Vomiting, Abdominal Pain, Diarrhea, Constipation,

Melena, Hematochezia


Genitourinary:  No Dysuria; Frequency, Incontinence; No Hematuria, No Retention


Musculoskeletal:  leg pain; No: neck pain, shoulder pain, arm pain


Neurological:  Weakness, Numbness; No: Incoordination, Change in speech, 

Confusion





Objective


Exam





Vital Signs








  Date Time  Temp Pulse Resp B/P (MAP) Pulse Ox O2 Delivery O2 Flow Rate FiO2


 


8/8/21 07:33 36.5 92 20 194/73 (113) 94 Room Air  


 


8/8/21 04:32 36.4 78 18 185/66 (105) 95 Room Air  


 


8/8/21 00:00 36.9 98 20 172/84 (113) 98 Room Air  


 


8/7/21 21:03 36.9 89 18 192/78 (116) 100 Room Air  


 


8/7/21 20:15      Room Air  


 


8/7/21 20:00 36.9 92 18 177/74 (108) 99 Room Air  


 


8/7/21 16:49 36.8 85 20 160/68 (98) 100 Room Air  


 


8/7/21 15:00 37.0 92 20 187/79    


 


8/7/21 12:16 36.2 85 20 149/65 99 Room Air  


 


8/7/21 12:01 36.7 87 20 144/67 98 Room Air  


 


8/7/21 11:50 36.5 87 18 144/67 (92) 98 Room Air  














I & O 


 


 8/8/21





 07:00


 


Intake Total 740 ml


 


Output Total 3100 ml


 


Balance -2360 ml





Capillary Refill : Less Than 3 Seconds


General Appearance:  No Apparent Distress, WD/WN, Obese


HEENT:  PERRL/EOMI, Pharynx Normal, Moist Mucous Membranes


Neck:  Full Range of Motion, Non Tender, Supple


Respiratory:  Chest Non Tender, No Accessory Muscle Use, No Respiratory 

Distress, Crackles (B/L bases), Wheezing


Cardiovascular:  Regular Rate, Rhythm, No Gallop, No Murmur, Normal Peripheral 

Pulses


Peripheral Pulses:  2+ Radial Pulses (R), 2+ Radial Pulses (L)


Gastrointestinal:  normal bowel sounds, non tender, soft, no organomegaly, other

(scant blood from rectum per nursing)


Extremity:  Normal Capillary Refill, Normal Range of Motion, Non Tender, Calf 

Tenderness, Pedal Edema (2+)


Neurologic/Psychiatric:  Alert, Oriented x3, Normal Mood/Affect, CNs II-XII Norm

as Tested


Skin:  Normal Color, Warm/Dry


Lymphatic:  No Adenopathy





Results


Lab


Laboratory Tests


8/7/21 12:01: Glucometer 115H


8/7/21 16:30: Glucometer 120H


8/7/21 18:15: 


8/7/21 20:16: Glucometer 107


8/7/21 21:15: 


Hemoglobin 8.4#L, Hematocrit 25L


8/8/21 05:25: 


Hemoglobin 8.8L, Hematocrit 25L, Sodium Level 136, Potassium Level 4.1, Chloride

Level 104, Carbon Dioxide Level 21, Anion Gap 11, Blood Urea Nitrogen 9, 

Creatinine 1.99H, Estimat Glomerular Filtration Rate 24, BUN/Creatinine Ratio 5,

Glucose Level 103, Calcium Level 8.2L, Magnesium Level 1.4L





Microbiology


8/4/21 MRSA Screen - Final, Complete


         MRSA not isolated





Assessment/Plan


Anemia -secondary to bleeding from internal hemorrhoids - improved


Hyponatremia - resolved


Dehydration - resolved


Acute Renal Failure





Pt's hemoglobin dropped to 6.7 yesterday,1 unit given, today Hgb 8.8.


Monitor for bleeding from surgical site, trace bleeding continues, maintain 

nutritional and fluid intake, stool softener.





RAFAEL MENDEZ DO 8/8/21 1218:


Subjective


Time Seen by a Provider:  10:54


Subjective/Events-last exam


Pt seen and examined, finally had a BM.  Still has scant bleeding.  She denies 

trouble breathing but was told she has "wheezing".


Review of Systems


General:  No Chills; Fatigue


Pulmonary:  No Dyspnea, No Cough


Cardiovascular:  No: Chest Pain, Palpitations


Gastrointestinal:  Hematochezia; No: Nausea, Vomiting, Abdominal Pain





Objective


Exam


General Appearance:  No Apparent Distress, Obese


HEENT:  Moist Mucous Membranes


Respiratory:  Chest Non Tender, No Accessory Muscle Use, No Respiratory 

Distress, Crackles (B/L bases), Wheezing


Cardiovascular:  Regular Rate, Rhythm, No Murmur


Gastrointestinal:  non tender, soft, no organomegaly





Assessment/Plan


B/L pleural effusions - per Radiology, will start IS and RT breathing treatments

as needed


Anemia -secondary to bleeding from internal hemorrhoids - improved


Hyponatremia - resolved


Dehydration - resolved


Acute Renal Failure





Pt's hemoglobin dropped to 6.7 yesterday,1 unit given, today Hgb 8.8.


Monitor for bleeding from surgical site, trace bleeding continues, maintain 

nutritional and fluid intake, stool softener.





Supervisory-Addendum Brief


Verification & Attestation


Participated in pt care:  history, MDM, physical


Personally performed:  exam, history, MDM, supervision of care


Care discussed with:  Medical Student


Procedures:  n/a


Verification and Attestation of Medical Student E/M Service





A medical student performed and documented this service. I then reviewed and 

verified all information documented by the medical student and made 

modifications to such information, when appropriate. I personally performed a 

physical exam, medical decision making and then discussed any differences 

between the notes and made revisions as necessary to create one note.





Rafael Mendez , 8/8/21 , 12:18











ESTER ELLISON                Aug 8, 2021 08:37


RAFAEL MENDEZ DO                Aug 8, 2021 12:18

## 2021-08-08 NOTE — PROGRESS NOTE - HOSPITALIST
ASIYA HARRIS A MED STUDENT 8/8/21 0837:


Subjective


HPI/CC On Admission


Date Seen by Provider:  Aug 8, 2021


Time Seen by Provider:  08:00


PT IS A CLINIC PT OF DR MURRAY FOR WHOM I AM ON CALL. PT IS STATUS POST GI 

BLEED FROM HEMORRHOIDS WITH HEMORRHOIDECTOMY. THE PT REPORTS THAT SHE IS FEELING

BETTER, HAS RECTAL DISCOMFORT. PT HAD THREE SMALL BOWEL MOVEMENTS YESTERDAY THAT

WERE SOFT, BUT SOLID. PT CONFIRMS PAIN WITH FIRST BM, BUT NOT SUBSEQUENT BM.  

SHE CONTINUES TO HAVE SOME BLOOD PER RECTUM, BUT THIS IS IMPROVING. PT IS ABLE 

TO AMBULATE WITH WALKER AND MINIMAL ASSISTANCE AND USE THE RESTROOM ON HER OWN. 

PT STATES SHE TAKES ALPRAZOLAM 2 TO 3 TIMES DAILY AT HOME. SHE CURRENTLY HAS A 

HEADACHE.





Review of Systems


HEENT:  Head Aches


Pulmonary:  Cough (NONPRODUCTIVE)


Cardiovascular:  No: Chest Pain, Palpitations, Lt Headedness


Gastrointestinal:  Hematochezia; No: Abdominal Pain, Diarrhea, Constipation


Genitourinary:  No Dysuria; Frequency


Musculoskeletal:  leg pain (NEUROPATHY)


Neurological:  Numbness (NEUROPATHY IN BILATERAL LE )





Objective


Exam


Vital Signs





Vital Signs








  Date Time  Temp Pulse Resp B/P (MAP) Pulse Ox O2 Delivery O2 Flow Rate FiO2


 


8/8/21 07:33 36.5 92 20 194/73 (113) 94 Room Air  


 


8/5/21 16:28       2.00 





Capillary Refill : Less Than 3 Seconds


General Appearance:  No Apparent Distress, Obese


Respiratory:  No Accessory Muscle Use, No Respiratory Distress, Crackles


Cardiovascular:  Regular Rate, Rhythm


Gastrointestinal:  Normal Bowel Sounds, Soft, Tenderness


Rectal:  Other (ASSISTED PT WITH WIPING AFTER URINATION AND VISUALIZED A STREAK 

OF BLOOD ON TOILET PAPER)


Extremity:  Pedal Edema


Neurologic/Psychiatric:  Alert, Oriented x3


Skin:  Normal Color





Results/Procedures


Lab


Laboratory Tests


8/7/21 21:15








8/8/21 05:25








Patient resulted labs reviewed.


Imaging:  Reviewed Imaging Films





Assessment/Plan


Assessment and Plan


Assess & Plan/Chief Complaint


ACUTE ANEMIA SECONDARY TO RECTAL BLEED


POST OP HEMORRHOIDECTOMY


ACUTE ON CHRONIC RENAL FAILURE


DIABETES MELLITUS


HYPERTENSION


PERIPHERAL NEUROPATHY


WEAKNESS


HYPONATREMIA


EDEMA 


HYPOCALCEMIA


HYPOMAGNESEMIA


HEADACHE





ACUTE ANEMIA SECONDARY TO RECTAL BLEED


   -IMPROVED, H/H AT 8.8 AND 25, WILL CONTINUE TO MONITOR





POST OP HEMORRHOIDECTOMY


    - PT HAD THREE BOWEL MOVEMENTS THAT WERE SOFT, BUT SOLID





ACUTE ON CHRONIC RENAL FAILURE


   -MONITOR LABS, RENALLY ADJUST MEDICATIONS.


   -STOP FLUIDS AND LASIX





DIABETES MELLITUS


   - SUPPORTIVE CARE, MONITOR FSBS





HYPERTENSION


   - START HYDRALAZINE, CONTINUE TO MONITOR





PERIPHERAL NEUROPATHY


   - HOME REGIMEN RESUMED





WEAKNESS


   - WILL NEED THERAPY, CONTINUE WITH NURSING STAFF AMBULATING PT TID





HYPONATREMIA


   - RESOLVED





EDEMA 


   - IMPROVED, WILL CONTINUE TO MONITOR





HYPOCALCEMIA


   -WILL CONTINUE TO MONITOR





HYPOMAGNESEMIA


   -START MAG SULFATE, WILL CONTINUE TO MONITOR





HEADACHE


   -START TYLENOL


   -SCHEDULED ALPRAZOLAM BID





STEFFEN EDMONDS MD 8/8/21 1226:


Subjective


Subjective/Events-last exam


PT REPORTS THAT SHE IS FEELING BETTER, AND HAD SEVERAL BOWEL MOVEMENTS OVER THE 

PAST 12-18 HOURS.


SHE REPORTS THAT SHE IS TIRED OF BEING IN THE HOSPITAL AND WOULD LIKE TO BE 

DISCHARGED TO HOME.


Review of Systems


General:  No Fatigue, No Malaise


HEENT:  No Head Aches


Pulmonary:  No Dyspnea, No Cough (NONPRODUCTIVE)


Cardiovascular:  No: Chest Pain, Palpitations


Gastrointestinal:  Constipation (IMPROVED)


Genitourinary:  Frequency


Neurological:  Numbness (NEUROPATHY IN BILATERAL LE )





Objective


Exam


General Appearance:  No Apparent Distress, WD/WN, Obese


HEENT:  PERRL/EOMI


Neck:  Full Range of Motion, Supple


Respiratory:  Chest Non Tender, No Accessory Muscle Use, No Respiratory 

Distress, Crackles (IN BASES)


Cardiovascular:  Regular Rate, Rhythm


Gastrointestinal:  Normal Bowel Sounds, Non Tender, Soft


Rectal:  Deferred


Extremity:  Normal Capillary Refill, Non Tender, No Calf Tenderness, Pedal Edema


Neurologic/Psychiatric:  Alert, Oriented x3, No Motor/Sensory Deficits, Normal M

ood/Affect


Skin:  Normal Color, Warm/Dry


Lymphatic:  No Adenopathy





Results/Procedures


Imaging:  Reviewed Imaging Films





Assessment/Plan


Assessment and Plan


Assess & Plan/Chief Complaint


ACUTE ANEMIA SECONDARY TO RECTAL BLEED


   - STABLE HGB POST BLOOD TRANSFUSION, MONITOR LABS TOMORROW


   - WAITING ON RESULTS FROM IRON PANEL





POST OP HEMORRHOIDECTOMY


    - DEFER TO DR. WRIGHT


   - PT TO HAVE PROCTOFOAM TO HELP DECREASE RECTAL EDEMA AND HOPEFULLY HELP 

PROPAGATE BOWEL MOVEMENT.





ACUTE ON CHRONIC RENAL FAILURE


   - MONITOR LABS, RENALLY ADJUST MEDICATIONS





CONSTIPATION


   - IMPROVED WITH PT HAVING MULTIPLE BOWEL MOVEMENTS YESTERDAY 


   - MIRALAX AND COLACE AND MONITOR OUTPUT





DIABETES MELLITUS


   - SUPPORTIVE CARE, MONITOR FSBS





HYPERTENSION


   - MONITOR BP





PERIPHERAL NEUROPATHY


   - HOME REGIMEN RESUMED





WEAKNESS


   - WILL NEED THERAPY, CONTINUE WITH NURSING STAFF AMBULATING PT TID





HYPONATREMIA


   - MONITOR LABS, IV FLUIDS





EDEMA 


   - IMPROVED AFTER LASIX THERAPY


   - DC IV FLUIDS





Supervisory-Addendum Brief


Verification & Attestation


Participated in pt care:  history, MDM, physical


Personally performed:  exam, history, MDM, supervision of care


Care discussed with:  Medical Student


Procedures:  n/a


Results interpretation:  Verified all documentation


AGREE WITH STUDENT NOTE AS DOCUMENTED, SEE MY PERSONAL DOCUMENTATION.


I PERSONALLY REVIEWED DATA, INTERVIEWED AND EXAMINED PT AND DISCUSSED THE PLAN 

OF CARE WITH PT AND HER .











ASIYA HARRIS MED STUDENT     Aug 8, 2021 08:37


STEFFEN EDMONDS MD             Aug 8, 2021 12:26

## 2021-08-08 NOTE — DIAGNOSTIC IMAGING REPORT
EXAM: PA and lateral chest at 10:31 AM



INDICATION: Atelectasis



The borderline cardiomegaly noted on the prior exam of 09/29/2016

is again evident and not significantly changed. 



In the interval since the prior study, a small amount of

atelectasis/infiltrate has developed in the right lung base.

There also appear to be small bilateral pleural effusions

present. In addition, on the PA view, there is a 1.5 mm oval

density which was not clearly evident on the prior study. This

finding cannot be identified on the lateral view and may be

secondary to superimposition. The possibility that there is a

parenchymal nodule in this area should still be considered. A

follow-up chest exam would be recommended for continued

evaluation. If this density persists, then CT of the chest may be

necessary for further study.



The mediastinum is not widened. The osseous structures are

intact. 



IMPRESSION:

1. The appearance of the chest has worsened since the prior study

as mild right lower lobe atelectasis/infiltrate and small

bilateral pleural effusions have developed.

2. The vague nodular density overlying the right mid lung is of

uncertain etiology. Considerations and recommendations as above.



Dictated by: 



  Dictated on workstation # [id number]

## 2021-08-09 VITALS — DIASTOLIC BLOOD PRESSURE: 60 MMHG | SYSTOLIC BLOOD PRESSURE: 129 MMHG

## 2021-08-09 VITALS — DIASTOLIC BLOOD PRESSURE: 73 MMHG | SYSTOLIC BLOOD PRESSURE: 133 MMHG

## 2021-08-09 VITALS — DIASTOLIC BLOOD PRESSURE: 72 MMHG | SYSTOLIC BLOOD PRESSURE: 123 MMHG

## 2021-08-09 VITALS — DIASTOLIC BLOOD PRESSURE: 64 MMHG | SYSTOLIC BLOOD PRESSURE: 147 MMHG

## 2021-08-09 LAB
BUN/CREAT SERPL: 5
CALCIUM SERPL-MCNC: 8.2 MG/DL (ref 8.5–10.1)
CHLORIDE SERPL-SCNC: 100 MMOL/L (ref 98–107)
CO2 SERPL-SCNC: 21 MMOL/L (ref 21–32)
CREAT SERPL-MCNC: 1.56 MG/DL (ref 0.6–1.3)
GFR SERPLBLD BASED ON 1.73 SQ M-ARVRAT: 32 ML/MIN
GLUCOSE SERPL-MCNC: 136 MG/DL (ref 70–105)
HCT VFR BLD CALC: 26 % (ref 35–52)
HGB BLD-MCNC: 8.7 G/DL (ref 11.5–16)
POTASSIUM SERPL-SCNC: 4 MMOL/L (ref 3.6–5)
SODIUM SERPL-SCNC: 132 MMOL/L (ref 135–145)

## 2021-08-09 RX ADMIN — PRAMOXINE HYDROCHLORIDE HYDROCORTISONE ACETATE SCH APPLIC: 100; 100 AEROSOL, FOAM TOPICAL at 08:09

## 2021-08-09 RX ADMIN — HYDRALAZINE HYDROCHLORIDE SCH MG: 25 TABLET ORAL at 12:40

## 2021-08-09 RX ADMIN — INSULIN ASPART SCH UNIT: 100 INJECTION, SOLUTION INTRAVENOUS; SUBCUTANEOUS at 06:37

## 2021-08-09 RX ADMIN — LOSARTAN POTASSIUM SCH MG: 100 TABLET, FILM COATED ORAL at 08:09

## 2021-08-09 RX ADMIN — HYDRALAZINE HYDROCHLORIDE SCH MG: 25 TABLET ORAL at 00:01

## 2021-08-09 RX ADMIN — ACETAMINOPHEN PRN MG: 325 TABLET ORAL at 06:55

## 2021-08-09 RX ADMIN — ALPRAZOLAM SCH MG: 0.25 TABLET ORAL at 08:09

## 2021-08-09 RX ADMIN — DOCUSATE SODIUM SCH MG: 100 CAPSULE ORAL at 08:09

## 2021-08-09 RX ADMIN — ALPRAZOLAM PRN MG: 0.25 TABLET ORAL at 15:13

## 2021-08-09 RX ADMIN — HYDRALAZINE HYDROCHLORIDE SCH MG: 25 TABLET ORAL at 06:55

## 2021-08-09 RX ADMIN — Medication SCH ML: at 06:56

## 2021-08-09 RX ADMIN — INSULIN ASPART SCH UNIT: 100 INJECTION, SOLUTION INTRAVENOUS; SUBCUTANEOUS at 12:53

## 2021-08-09 RX ADMIN — GABAPENTIN SCH MG: 400 CAPSULE ORAL at 08:09

## 2021-08-09 RX ADMIN — PANTOPRAZOLE SODIUM SCH MG: 40 TABLET, DELAYED RELEASE ORAL at 08:09

## 2021-08-09 NOTE — DISCHARGE SUMMARY
Diagnosis/Chief Complaint


Date of Admission


Aug 3, 2021 at 14:41


Date of Discharge








Discharge Summary


Discharge Physical Examination


Allergies:  


Coded Allergies:  


     NSAIDS (Non-Steroidal Anti-Inflamma (Verified  Allergy, Intermediate, 

3/4/14)


   STATES SHE GETS 'CRAZY'


     Sulfa (Sulfonamide Antibiotics) (Unverified  Allergy, Unknown, 3/4/14)


     ampicillin (Unverified  Allergy, Unknown, 3/4/14)


     prednisone (Unverified  Adverse Reaction, Unknown, 5/1/14)


Vitals & I&Os





Vital Signs








  Date Time  Temp Pulse Resp B/P (MAP) Pulse Ox O2 Delivery O2 Flow Rate FiO2


 


8/9/21 08:00 37.4 97 16 147/64 (91) 95 Room Air  


 


8/5/21 16:28       2.00 











Hospital Course


Pending Labs


Laboratory Tests


8/9/21 04:56: Glucometer 111


8/9/21 06:00: 


Hemoglobin 8.7, Hematocrit 26, Sodium Level 132, Potassium Level 4.0, Chloride 

Level 100, Carbon Dioxide Level 21, Anion Gap 11, Blood Urea Nitrogen 8, 

Creatinine 1.56, Estimat Glomerular Filtration Rate 32, BUN/Creatinine Ratio 5, 

Glucose Level 136, Calcium Level 8.2








Discharge


Instructions to patient/family


Please see electronic discharge instructions given to patient.


Discharge Medications


Reviewed and agree with Discharge Medication list on patient's Discharge 

Instruction sheet











STEFFEN EDMONDS MD             Aug 9, 2021 09:35

## 2021-08-09 NOTE — D/C HH FACE TO FACE ORDER
D/C  Face to Face Orders


Reconcile Patient Problems


Problems Reviewed?:  Yes





Instructions for Patient


[facility]


Patient Instructions/FollowUp:  


1 wk follow up with dr. ng


Physician to follow Patient:  hernandez


Discharge Diet for Home:  ADA Diet


Patient Problems:  


diabetes


hypertension


weakness


rectal bleeding from hemorrhoids


constipation


weakness, gait instability


peripheral neuropathy


Goals for Patient:  


improved strength





Patient Data-Allergies,Ht & Wt


Patient Allergies:  


Coded Allergies:  


     NSAIDS (Non-Steroidal Anti-Inflamma (Verified  Allergy, Intermediate, 

3/4/14)


   STATES SHE GETS 'CRAZY'


     Sulfa (Sulfonamide Antibiotics) (Unverified  Allergy, Unknown, 3/4/14)


     ampicillin (Unverified  Allergy, Unknown, 3/4/14)


     prednisone (Unverified  Adverse Reaction, Unknown, 5/1/14)


Height (Feet):  5


Height (Inches):  0.00


Weight (Pounds):  158


Weight (Ounces):  0.0





Home Health Need/Face to Face


Date of Face to Face:  Aug 9, 2021


Clinical Findings:  Generalized weakness and fatigue, Muscle weakness, Pain with

ambulation, Shortness of breath


I have seen Pt face-to-face:  Yes


Discharged To:  Home


Diagnosis/Conditions:  


diabetes


hypertension


weakness


rectal bleeding from hemorrhoids


constipation


weakness, gait instability


peripheral neuropathy


Patient is Homebound due to:  Muscle weakness, Pain w/ambulation


Homebound Status


   Due to the above stated illness, injury or surgical procedure (medical 

condition or diagnosis) and associated clinical findings, the patient is 

homebound because of his/her inability to leave home except with aid of a 

supportive device and/or person AND leaving the home requires a considerable and

taxing effort or is medically contraindicated.


Pt req the following assistanc:  Walker





Home Health Nursing Orders


Home Health Services Order:  Nursing Services, Physical Therapy-Evaluate & Treat





cbc with diff and cmp in 4 days from dc - results to hernandez's office





Home Health Infusion Therapy


Line Start Date:  Aug 6, 2021





Therapy Orders


Therapy Orders:  PT to assess for OT


Therapy Specific Orders:  Eval assistive deivces, Teach enviro 

modifications/safety, Increase strength/endurance


Certify Stmt


I certify that this patient is under my care and that I, a nurse practitioner or

a physician; a assistant working with me, had a face to face encounter that -

meets the physician face to face encounter requirements with this patient as 

dated.


Medication List:





Active Scripts


Active


Reported


ALPRAZolam 0.25 Mg Tablet 0.25 Mg PO TID PRN


Vitamin D3 (Cholecalciferol (Vitamin D3)) 25 Mcg Capsule 25 Mcg PO DAILY


Metanx Capsule (Levomefolate/B6/B12/Algal Oil) 1 Each Capsule 1 Each PO BID


Glucosamine Chondroitin Cap (Glucos Sul 2Kcl/MSM/Chond/C/Mn) 1 Each Capsule 1 

Each PO DAILY


Dicyclomine HCl 20 Mg Tablet 20 Mg PO QID PRN


Tradjenta (Linagliptin) 5 Mg Tablet 5 Mg PO DAILY


Losartan Potassium 100 Mg Tablet 100 Mg PO DAILY


Allopurinol 100 Mg Tablet 100 Mg PO DAILY


Doxazosin Mesylate 2 Mg Tablet 2 Mg PO HS


Magnesium Oxide 400 Mg Tablet 400 Mg PO DAILY PRN


     HOLD FOR LOOSE STOOLS


Gabapentin 400 Mg Capsule 400 Mg PO BID


Dexilant (Dexlansoprazole) 60 Mg Cap.bp 60 Mg PO DAILY


Premarin (Estrogens Conjugated) 1.25 Mg Tab 1.25 Mg PO DAILY


Imipramine HCl 50 Mg Tablet 50 Mg PO HS


Lab results:





Laboratory Tests








Test


 8/8/21


11:34 8/8/21


15:58 8/8/21


16:04 8/8/21


20:15 Range/Units


 


 


Glucometer 205 H  112 H 155 H   MG/DL


 


Urine Color  YELLOW     


 


Urine Clarity  CLOUDY     


 


Urine pH  5.5    5-9  


 


Urine Specific Gravity  1.010 L   1.016-1.022  


 


Urine Protein  NEGATIVE    NEGATIVE  


 


Urine Glucose (UA)  NEGATIVE    NEGATIVE  


 


Urine Ketones  NEGATIVE    NEGATIVE  


 


Urine Nitrite  NEGATIVE    NEGATIVE  


 


Urine Bilirubin  NEGATIVE    NEGATIVE  


 


Urine Urobilinogen  0.2    < = 1.0  MG/DL


 


Urine Leukocyte Esterase  3+ H   NEGATIVE  


 


Urine RBC (Auto)  NEGATIVE    NEGATIVE  


 


Urine RBC  0-2     /HPF


 


Urine WBC   H    /HPF


 


Urine Squamous Epithelial


Cells 


 RARE 


 


 


  /HPF





 


Urine Crystals  NONE     /LPF


 


Urine Bacteria  LARGE H    /HPF


 


Urine Casts  NONE     /LPF


 


Urine Mucus  NEGATIVE     /LPF


 


Urine Culture Indicated  YES     


 


Test


 8/9/21


04:56 8/9/21


06:00 


 


 Range/Units


 


 


Glucometer 111 H      MG/DL


 


Hemoglobin  8.7 L   11.5-16.0  g/dL


 


Hematocrit  26 L   35-52  %


 


Sodium Level  132 L   135-145  MMOL/L


 


Potassium Level  4.0    3.6-5.0  MMOL/L


 


Chloride Level  100      MMOL/L


 


Carbon Dioxide Level  21    21-32  MMOL/L


 


Anion Gap  11    5-14  MMOL/L


 


Blood Urea Nitrogen  8    7-18  MG/DL


 


Creatinine


 


 1.56 H


 


 


 0.60-1.30


MG/DL


 


Estimat Glomerular Filtration


Rate 


 32 


 


 


  





 


BUN/Creatinine Ratio  5     


 


Glucose Level  136 H     MG/DL


 


Calcium Level  8.2 L   8.5-10.1  MG/DL








My orders:





Orders - STEFFEN EDMONDS MD


Alprazolam Tablet (Xanax Tablet) (8/8/21 09:45)


Hemoglobin And Hematocrit (8/9/21 05:00)


Basic Metabolic Panel (8/9/21 05:00)


Ua Culture If Indicated (8/8/21 16:21)


Urine Culture (8/8/21 15:58)


Ceftriaxone (Rocephin) (8/8/21 18:00)


Hydralazine Tablet (Apresoline Tablet) (8/8/21 18:00)


Hydralazine Tablet (Apresoline Tablet) (8/9/21 00:00)


Attending Discharge Inpt/Inobs (8/9/21 09:35)


[facility] Dischage (8/9/21 09:35)














STEFFEN EDMONDS MD             Aug 9, 2021 09:38

## 2021-08-09 NOTE — PROGRESS NOTE - SURGERY
TERRAESTER 8/9/21 0747:


Subjective


Date Seen by a Provider:  Aug 9, 2021


Time Seen by a Provider:  06:41


Subjective/Events-last exam


PT recovering from anemia and cauterization of bleeding internal hemroids


Small amounts of bright red blood continue to be present according to nursing.


One BM last night with trace blood, urinating regularly.  Hb remains stable from

8.8 to 8.7 today.


Also hyponatremia has improved but remains low.


Review of Systems


General:  No Chills, No Night Sweats, No Fatigue, No Malaise; Appetite


HEENT:  Head Aches; No Visual Changes, No Eye Pain, No Ear Pain, No Dysphasia, 

No Sinus Congestion, No Post Nasal Drip, No Sore Throat; Other


Pulmonary:  No Dyspnea, No Cough, No Pleuritic Chest Pain


Cardiovascular:  No: Chest Pain, Palpitations, Orthopnea, Paroxysmal Noc. 

Dyspnea, Edema, Lt Headedness


Gastrointestinal:  No: Nausea, Vomiting, Abdominal Pain, Diarrhea, Constipation,

Melena, Hematochezia


Genitourinary:  No Dysuria, No Frequency, No Incontinence, No Hematuria, No 

Retention


Musculoskeletal:  No: neck pain, shoulder pain, arm pain, back pain, hand pain, 

leg pain, foot pain


Neurological:  Weakness, Numbness; No: Incoordination, Change in speech, 

Confusion, Seizures


epitstaxis, secondary to dryness and picking





Objective


Exam





Vital Signs








  Date Time  Temp Pulse Resp B/P (MAP) Pulse Ox O2 Delivery O2 Flow Rate FiO2


 


8/9/21 03:57 36.8 91 22 133/73 (93) 96 Room Air  


 


8/8/21 23:17 37.2 97 20 138/63 (88) 96 Room Air  


 


8/8/21 21:04  93 18  98 Room Air  


 


8/8/21 20:59      Room Air  


 


8/8/21 19:28 37.0 106 18 142/63 (89) 96 Room Air  


 


8/8/21 16:00 36.6 92 20 184/74 (110) 97 Room Air  


 


8/8/21 11:35 36.5 95 20 149/64 (92) 98 Room Air  


 


8/8/21 11:00      Room Air  


 


8/8/21 08:00     97 Room Air  














I & O 


 


 8/9/21





 07:00


 


Intake Total 2212 ml


 


Output Total 800 ml


 


Balance 1412 ml





Capillary Refill : Less Than 3 Seconds


General Appearance:  No Apparent Distress, WD/WN, Obese


HEENT:  PERRL/EOMI, Pharynx Normal, Moist Mucous Membranes, Other (epitstaxis, 

secondary to dryness and picking)


Neck:  Full Range of Motion, Non Tender, Supple


Respiratory:  Chest Non Tender; No Lungs Clear; Normal Breath Sounds, No 

Accessory Muscle Use, No Respiratory Distress, Crackles (In left base - using 

ICS)


Cardiovascular:  Regular Rate, Rhythm, No Gallop, No Murmur, Normal Peripheral 

Pulses


Peripheral Pulses:  2+ Radial Pulses (R), 2+ Radial Pulses (L)


Gastrointestinal:  non tender, soft, no organomegaly, no pulsatile mass


Extremity:  Normal Capillary Refill, Normal Range of Motion, Non Tender, No Calf

Tenderness, Pedal Edema


Neurologic/Psychiatric:  Alert, Oriented x3, No Motor/Sensory Deficits, Normal 

Mood/Affect


Skin:  Normal Color, Warm/Dry


Lymphatic:  No Adenopathy (cervical, axillary)





Results


Lab


Laboratory Tests


8/8/21 11:34: Glucometer 205H


8/8/21 15:58: 


Urine Color YELLOW, Urine Clarity CLOUDY, Urine pH 5.5, Urine Specific Gravity 

1.010L, Urine Protein NEGATIVE, Urine Glucose (UA) NEGATIVE, Urine Ketones 

NEGATIVE, Urine Nitrite NEGATIVE, Urine Bilirubin NEGATIVE, Urine Urobilinogen 

0.2, Urine Leukocyte Esterase 3+H, Urine RBC (Auto) NEGATIVE, Urine RBC 0-2, 

Urine WBC 50-100H, Urine Squamous Epithelial Cells RARE, Urine Crystals NONE, 

Urine Bacteria LARGEH, Urine Casts NONE, Urine Mucus NEGATIVE, Urine Culture 

Indicated YES


8/8/21 16:04: Glucometer 112H


8/8/21 20:15: Glucometer 155H


8/9/21 04:56: Glucometer 111H


8/9/21 06:00: 


Hemoglobin 8.7L, Hematocrit 26L, Sodium Level 132L, Potassium Level 4.0, 

Chloride Level 100, Carbon Dioxide Level 21, Anion Gap 11, Blood Urea Nitrogen 

8, Creatinine 1.56H, Estimat Glomerular Filtration Rate 32, BUN/Creatinine Ratio

5, Glucose Level 136H, Calcium Level 8.2L





Microbiology


8/8/21 Urine Culture - Preliminary, Resulted


         Gram Negative Fede


8/4/21 MRSA Screen - Final, Complete


         MRSA not isolated





Assessment/Plan


B/L pleural effusions - per Radiology, will start IS and RT breathing treatments

as needed


Anemia -secondary to bleeding from internal hemorrhoids - improved


Hyponatremia - improved


Dehydration - resolved


Acute Renal Failure





Plan - Monitor for bleeding from surgical site, trace bleeding continues, 

maintain nutritional and intake sodium rich fluids, stool softener.


Continue IS.





JAIR MENDEZ DO 8/9/21 1214:


Subjective


Time Seen by a Provider:  08:46


Subjective/Events-last exam


Pt seen and examined, states she is doing fine but has some wheezing and "I'm 

all swollen".  Also states she is going home today.


Review of Systems


General:  No Chills, No Night Sweats; Malaise


Pulmonary:  No Dyspnea, No Cough


Cardiovascular:  No: Chest Pain, Palpitations


Gastrointestinal:  Hematochezia; No: Nausea, Vomiting, Abdominal Pain





Objective


Exam


General Appearance:  No Apparent Distress, Obese


HEENT:  Other (epitstaxis, secondary to dryness and picking)


Respiratory:  Normal Breath Sounds, No Accessory Muscle Use, No Respiratory 

Distress, Crackles (In left base - using ICS)


Cardiovascular:  Regular Rate, Rhythm, No Murmur


Gastrointestinal:  non tender, soft, no organomegaly





Assessment/Plan


B/L pleural effusions - per Radiology, will start IS and RT breathing treatments

as needed


Anemia -secondary to bleeding from internal hemorrhoids - improved


Hyponatremia - improved


Dehydration - resolved


Acute Renal Failure





Plan - Can d/c pt home and she should monitor for bleeding from rectum, trace 

bleeding still normal.  She will be getting Lasix for swelling.  Told to 

ambulate and use IS at home.





Supervisory-Addendum Brief


Verification & Attestation


Participated in pt care:  history, MDM, physical


Personally performed:  exam, history, MDM, supervision of care


Care discussed with:  Medical Student


Procedures:  n/a


Verification and Attestation of Medical Student E/M Service





A medical student performed and documented this service. I then reviewed and 

verified all information documented by the medical student and made 

modifications to such information, when appropriate. I personally performed a 

physical exam, medical decision making and then discussed any differences 

between the notes and made revisions as necessary to create one note.





Jair Mendez , 8/9/21 , 12:14











ESTER ELLISON                Aug 9, 2021 07:47


JAIR MENDEZ DO                Aug 9, 2021 12:14

## 2021-08-24 ENCOUNTER — HOSPITAL ENCOUNTER (OUTPATIENT)
Dept: HOSPITAL 75 - PREOP | Age: 76
Discharge: HOME | End: 2021-08-24
Attending: SURGERY
Payer: MEDICARE

## 2021-08-24 VITALS — HEIGHT: 60.98 IN | BODY MASS INDEX: 31.22 KG/M2 | WEIGHT: 165.35 LBS

## 2021-08-24 DIAGNOSIS — Z01.818: Primary | ICD-10-CM

## 2021-08-25 ENCOUNTER — HOSPITAL ENCOUNTER (OUTPATIENT)
Dept: HOSPITAL 75 - SDC | Age: 76
End: 2021-08-25
Attending: SURGERY
Payer: MEDICARE

## 2021-08-25 VITALS — SYSTOLIC BLOOD PRESSURE: 159 MMHG | DIASTOLIC BLOOD PRESSURE: 49 MMHG

## 2021-08-25 VITALS — HEIGHT: 60.63 IN | BODY MASS INDEX: 31.62 KG/M2 | WEIGHT: 165.35 LBS

## 2021-08-25 VITALS — DIASTOLIC BLOOD PRESSURE: 50 MMHG | SYSTOLIC BLOOD PRESSURE: 152 MMHG

## 2021-08-25 VITALS — SYSTOLIC BLOOD PRESSURE: 152 MMHG | DIASTOLIC BLOOD PRESSURE: 50 MMHG

## 2021-08-25 VITALS — SYSTOLIC BLOOD PRESSURE: 159 MMHG | DIASTOLIC BLOOD PRESSURE: 53 MMHG

## 2021-08-25 VITALS — DIASTOLIC BLOOD PRESSURE: 76 MMHG | SYSTOLIC BLOOD PRESSURE: 140 MMHG

## 2021-08-25 VITALS — SYSTOLIC BLOOD PRESSURE: 161 MMHG | DIASTOLIC BLOOD PRESSURE: 48 MMHG

## 2021-08-25 VITALS — DIASTOLIC BLOOD PRESSURE: 51 MMHG | SYSTOLIC BLOOD PRESSURE: 155 MMHG

## 2021-08-25 DIAGNOSIS — Z79.891: ICD-10-CM

## 2021-08-25 DIAGNOSIS — Z79.899: ICD-10-CM

## 2021-08-25 DIAGNOSIS — Z79.82: ICD-10-CM

## 2021-08-25 DIAGNOSIS — I10: ICD-10-CM

## 2021-08-25 DIAGNOSIS — F41.9: ICD-10-CM

## 2021-08-25 DIAGNOSIS — E66.9: ICD-10-CM

## 2021-08-25 DIAGNOSIS — E11.9: ICD-10-CM

## 2021-08-25 DIAGNOSIS — Z80.9: ICD-10-CM

## 2021-08-25 DIAGNOSIS — I77.9: ICD-10-CM

## 2021-08-25 DIAGNOSIS — K62.5: Primary | ICD-10-CM

## 2021-08-25 DIAGNOSIS — R10.31: ICD-10-CM

## 2021-08-25 LAB
HCT VFR BLD CALC: 34 % (ref 35–52)
HGB BLD-MCNC: 10.9 G/DL (ref 11.5–16)

## 2021-08-25 PROCEDURE — 87081 CULTURE SCREEN ONLY: CPT

## 2021-08-25 PROCEDURE — 85018 HEMOGLOBIN: CPT

## 2021-08-25 PROCEDURE — 82947 ASSAY GLUCOSE BLOOD QUANT: CPT

## 2021-08-25 PROCEDURE — 36415 COLL VENOUS BLD VENIPUNCTURE: CPT

## 2021-08-25 PROCEDURE — 85014 HEMATOCRIT: CPT

## 2021-08-25 NOTE — DISCHARGE INST-SURGICAL
Discharge Inst-Surgical


Depart Medication/Instructions


New, Converted or Re-Newed RX:  Other (NO rx needed)


Patient Instructions


Follow up Appt:


Make appointment for 1 week. [phone]





Instructions:


No strenuous activity. 


May shower in 24 hours, no tub bath or soaking.


Use incentive spirometer at home as directed.


No Smoking





Skin/Wound Care:


Try holding pressure or even placing tampon to stop/control bleeding.





Symptoms to Report:


Appetite Changes, Extremity Discoloration, Numbness/Tingling, Swelling 

Increased, Bleeding Excessive, Eyesight Changes, Pain Increased, Urine Color 

Change, Constipation(Persistent), Fever over 101 degree F, Pain/Pressure in 

chest, Urinating Difficulty, Cough Up/Vomit Blood, Heart Beat Irreg/Pounding, 

Pain/Pressure in jaw, Cramps in feet or legs, Lightheadedness, Pain/Pressure in 

shoulder, Diarrhea(Persistent), Memory Changes Suddenly, Questions/Concerns, 

Weight gain consecutive days, Dizziness/Fainting, Nausea/Vomiting, Shortness of 

Breath, Weight gain over 2 pounds








If questions or concerns contact your physician 


Or seek help at emergency department.





Consults/Follow Up


Patient Instructions:  


Take stool softeners





Activity


Activity as Tolerated:  Yes


Driving Instructions:  No Driving/Refer to Dr. Cook


Discharge Diet:  No Restrictions (increased fiber)


If Any Problems/Questions/Issu:  Contact Your Physician, Go to Emergency Room





Skin/Wound Care


Infection Signs and Symptoms:  Increased Redness, Foul Odor of Wound, Increased 

Drainage, Skin Itchy or Has a Rash, Increased Swelling, Temperature Above 101  F


Bathing Instructions:  JAIR Wallis DO               Aug 25, 2021 12:43

## 2021-08-25 NOTE — PROGRESS NOTE-POST OPERATIVE
Post-Operative Progess Note


Surgeon (s)/Assistant (s)


Surgeon


JAIR WRIGHT DO


Assistant:  RIKKI Steele





Pre-Operative Diagnosis


rectal bleed





Post-Operative Diagnosis





same





Procedure & Operative Findings


Date of Procedure


8/25/21


Procedure Performed/Findings


Rectal Exam under anesthesia with cauterization of bleeding


Anesthesia Type


IV sedation by CRNA





Estimated Blood Loss


Estimated blood loss (mL):  scant





Specimens/Packing


Specimens Removed


none











JAIR WRIGHT DO               Aug 25, 2021 12:41

## 2021-08-25 NOTE — OPERATIVE REPORT
DATE OF SERVICE:  



PREOPERATIVE DIAGNOSIS:

Rectal bleed.



POSTOPERATIVE DIAGNOSIS:

Rectal bleed.



PROCEDURE PERFORMED:

Rectal exam under anesthesia with cautery of bleeding.



SURGEON:

Rafael Mendez DO.



FIRST ASSIST:

Danilo Rodríguez MS4.



ANESTHESIA:

IV sedation by the CRNA.



SPECIMENS:

None.



BLOOD LOSS:

Scant.



FLUIDS:

Per anesthesia.



POSTOPERATIVE CONDITION:

Stable.



INDICATION FOR PROCEDURE:

The patient is a 75-year-old female, who has been having some rectal bleeding,

was in the hospital two or three weeks ago with bleeding, found to have small

arteries pumping out blood, had it coagulated and she also had a large external

hemorrhoidal skin tag removed.  Since that time, she has continued to be oozing,

was complaining of clots, feeling weakness.  When I looked in the office, it

looked like just raw areas that were bleeding.  She came back a week later with

similar complaints.  We elected to try and go to the OR to see if we can fix

this.



FINDINGS:

The patient had again some raw areas bleeding from the mucosa of the rectum and

anus.  No vessels really bleeding and nothing on the inside.



PROCEDURE NOTE:

After informed consent was obtained, the patient was brought to the operating

room and placed on the table in a lithotomy position.  She was sterilely prepped

and draped in a normal fashion.  Ischial tuberosity block was performed as well

as local lidocaine blocks around the anus.  There was some blood, cleaned out

some fecal material.  I then used the speculum to look inside.  There was some

blood inside, but did not see any bleeding in the rectal mucosa, but down in the

distal rectum and anus, right on the edge of the mucosa, there was some bleeding

in the lithotomy position.  This bleeding was at 11 o'clock, 9 o'clock and 6

o'clock.  We tried to control this bleeding with the Bovie electrocautery and

then placed a sponge and held pressure for about 5 or 10 minutes.  Looked again,

delivered more cautery and then held pressure.  At the end, there was no

bleeding from this area, did not see any other obvious sources of bleeding and

elected to place a Gelfoam sponge rolled up and then with some lidocaine jelly

placed on it to hopefully hold pressure and hopefully continue to stop the

bleeding.  At this point, the area was then cleaned and dried, dressing placed

to hold the Gelfoam in place as well as catch any bleeding and the patient was 
then

transferred to recovery room in stable condition.  Sponge, instrument and needle

count correct at the end of the case.





Job ID: [id number]

DocumentID: [id number]

Dictated Date:  08/25/2021 14:56:19

Transcription Date: 08/25/2021 21:07:41

Dictated By: DO NONA ARRIAGA

## 2022-02-28 ENCOUNTER — HOSPITAL ENCOUNTER (OUTPATIENT)
Dept: HOSPITAL 75 - RAD | Age: 77
End: 2022-02-28
Attending: FAMILY MEDICINE
Payer: MEDICARE

## 2022-02-28 DIAGNOSIS — J90: Primary | ICD-10-CM

## 2022-02-28 DIAGNOSIS — J81.1: ICD-10-CM

## 2022-02-28 PROCEDURE — 71046 X-RAY EXAM CHEST 2 VIEWS: CPT

## 2022-02-28 NOTE — DIAGNOSTIC IMAGING REPORT
INDICATION: Dyspnea.



COMPARISON: 08/08/2021



FINDINGS: Frontal and lateral views the chest were obtained and

show stable borderline prominence of cardiac silhouette.

Pulmonary vasculature remains prominent. Kerley B lines are

noted. Pulmonary interstitium is also mildly diffusely prominent.

Small bibasilar effusions are present. There is no pneumothorax.

Osseous structures show no gross acute abnormalities. 



IMPRESSION:

1. Borderline cardiomegaly with pulmonary vascular congestion,

interstitial pulmonary edema, and small bibasilar effusions.



Dictated by: 



  Dictated on workstation # [id number]

## 2023-02-21 ENCOUNTER — HOSPITAL ENCOUNTER (OUTPATIENT)
Dept: HOSPITAL 75 - RAD | Age: 78
End: 2023-02-21
Attending: FAMILY MEDICINE
Payer: COMMERCIAL

## 2023-02-21 DIAGNOSIS — M79.671: ICD-10-CM

## 2023-02-21 DIAGNOSIS — M79.89: Primary | ICD-10-CM

## 2023-02-21 PROCEDURE — 73630 X-RAY EXAM OF FOOT: CPT

## 2023-02-21 NOTE — DIAGNOSTIC IMAGING REPORT
INDICATION: Right foot pain.



COMPARISON: None available.



TECHNIQUE: Three radiographs of the right foot dated 02/21/2023.



FINDINGS: No acute fracture or dislocation. No destructive

osseous process. Mild scattered degenerative changes are present.

The Lisfranc joint is well aligned. Small posterior and plantar

calcaneal enthesophytes. Focal soft tissue swelling is noted

involving the dorsum of the forefoot without suspicious

radiopaque foreign body.



IMPRESSION: Soft tissue swelling involving the dorsum of the

forefoot without suspicious radiopaque foreign body.



No acute osseous abnormality with mild degenerative changes

present.



Dictated by: 



  Dictated on workstation # [id number]

## 2023-04-18 ENCOUNTER — HOSPITAL ENCOUNTER (OUTPATIENT)
Dept: HOSPITAL 75 - RAD | Age: 78
End: 2023-04-18
Attending: FAMILY MEDICINE
Payer: MEDICARE

## 2023-04-18 DIAGNOSIS — R05.9: Primary | ICD-10-CM

## 2023-04-18 PROCEDURE — 71046 X-RAY EXAM CHEST 2 VIEWS: CPT

## 2023-04-18 NOTE — DIAGNOSTIC IMAGING REPORT
INDICATION: 

Cough.



TIME OF EXAM: 

4:36 PM.



COMPARISON: 

Correlation is made to the prior chest from 02/28/2022.



FINDINGS:

The heart size is stable. The lungs are clear. No infiltrates are

seen. There is no effusion or pneumothorax.



IMPRESSION: 

No acute cardiopulmonary process is detected.



Dictated by: 



  Dictated on workstation # [id number]

## 2023-04-26 ENCOUNTER — HOSPITAL ENCOUNTER (EMERGENCY)
Dept: HOSPITAL 75 - ER | Age: 78
Discharge: HOME | End: 2023-04-26
Payer: MEDICARE

## 2023-04-26 VITALS — DIASTOLIC BLOOD PRESSURE: 89 MMHG | SYSTOLIC BLOOD PRESSURE: 191 MMHG

## 2023-04-26 VITALS — BODY MASS INDEX: 30.73 KG/M2 | HEIGHT: 59.84 IN | WEIGHT: 156.53 LBS

## 2023-04-26 DIAGNOSIS — E11.40: ICD-10-CM

## 2023-04-26 DIAGNOSIS — Z20.822: ICD-10-CM

## 2023-04-26 DIAGNOSIS — I12.9: ICD-10-CM

## 2023-04-26 DIAGNOSIS — Z86.16: ICD-10-CM

## 2023-04-26 DIAGNOSIS — N18.9: ICD-10-CM

## 2023-04-26 DIAGNOSIS — Z90.5: ICD-10-CM

## 2023-04-26 DIAGNOSIS — R05.3: Primary | ICD-10-CM

## 2023-04-26 DIAGNOSIS — Z28.310: ICD-10-CM

## 2023-04-26 DIAGNOSIS — E11.22: ICD-10-CM

## 2023-04-26 LAB
ALBUMIN SERPL-MCNC: 3.6 GM/DL (ref 3.2–4.5)
ALP SERPL-CCNC: 85 U/L (ref 40–136)
ALT SERPL-CCNC: 10 U/L (ref 0–55)
APTT BLD: 35 SEC (ref 24–35)
BASOPHILS # BLD AUTO: 0 10^3/UL (ref 0–0.1)
BASOPHILS NFR BLD AUTO: 0 % (ref 0–10)
BILIRUB SERPL-MCNC: 0.3 MG/DL (ref 0.1–1)
BUN/CREAT SERPL: 16
CALCIUM SERPL-MCNC: 9.9 MG/DL (ref 8.5–10.1)
CHLORIDE SERPL-SCNC: 104 MMOL/L (ref 98–107)
CK MB SERPL-MCNC: 1.5 NG/ML (ref ?–6.6)
CK SERPL-CCNC: 23 U/L (ref 29–168)
CO2 SERPL-SCNC: 19 MMOL/L (ref 21–32)
CREAT SERPL-MCNC: 1.41 MG/DL (ref 0.6–1.3)
D DIMER PPP FEU-MCNC: <= 0.27 UG/ML (ref 0–0.49)
EOSINOPHIL # BLD AUTO: 0 10^3/UL (ref 0–0.3)
EOSINOPHIL NFR BLD AUTO: 0 % (ref 0–10)
ERYTHROCYTE [SEDIMENTATION RATE] IN BLOOD: 37 MM/HR (ref 0–30)
GFR SERPLBLD BASED ON 1.73 SQ M-ARVRAT: 38 ML/MIN
GLUCOSE SERPL-MCNC: 156 MG/DL (ref 70–105)
HCT VFR BLD CALC: 39 % (ref 35–52)
HGB BLD-MCNC: 12.6 G/DL (ref 11.5–16)
INR PPP: 0.9 (ref 0.8–1.4)
LYMPHOCYTES # BLD AUTO: 1.8 10^3/UL (ref 1–4)
LYMPHOCYTES NFR BLD AUTO: 15 % (ref 12–44)
MAGNESIUM SERPL-MCNC: 2 MG/DL (ref 1.6–2.4)
MANUAL DIFFERENTIAL PERFORMED BLD QL: NO
MCH RBC QN AUTO: 28 PG (ref 25–34)
MCHC RBC AUTO-ENTMCNC: 33 G/DL (ref 32–36)
MCV RBC AUTO: 87 FL (ref 80–99)
MONOCYTES # BLD AUTO: 0.6 10^3/UL (ref 0–1)
MONOCYTES NFR BLD AUTO: 5 % (ref 0–12)
NEUTROPHILS # BLD AUTO: 9.4 10^3/UL (ref 1.8–7.8)
NEUTROPHILS NFR BLD AUTO: 79 % (ref 42–75)
PLATELET # BLD: 236 10^3/UL (ref 130–400)
PMV BLD AUTO: 10.6 FL (ref 9–12.2)
POTASSIUM SERPL-SCNC: 4.7 MMOL/L (ref 3.6–5)
PROT SERPL-MCNC: 7.7 GM/DL (ref 6.4–8.2)
PROTHROMBIN TIME: 12.4 SEC (ref 12.2–14.7)
SODIUM SERPL-SCNC: 136 MMOL/L (ref 135–145)
WBC # BLD AUTO: 11.9 10^3/UL (ref 4.3–11)

## 2023-04-26 PROCEDURE — 71045 X-RAY EXAM CHEST 1 VIEW: CPT

## 2023-04-26 PROCEDURE — 71250 CT THORAX DX C-: CPT

## 2023-04-26 PROCEDURE — 85379 FIBRIN DEGRADATION QUANT: CPT

## 2023-04-26 PROCEDURE — 82550 ASSAY OF CK (CPK): CPT

## 2023-04-26 PROCEDURE — 83874 ASSAY OF MYOGLOBIN: CPT

## 2023-04-26 PROCEDURE — 85652 RBC SED RATE AUTOMATED: CPT

## 2023-04-26 PROCEDURE — 82553 CREATINE MB FRACTION: CPT

## 2023-04-26 PROCEDURE — 83880 ASSAY OF NATRIURETIC PEPTIDE: CPT

## 2023-04-26 PROCEDURE — 83735 ASSAY OF MAGNESIUM: CPT

## 2023-04-26 PROCEDURE — 36415 COLL VENOUS BLD VENIPUNCTURE: CPT

## 2023-04-26 PROCEDURE — 87636 SARSCOV2 & INF A&B AMP PRB: CPT

## 2023-04-26 PROCEDURE — 84484 ASSAY OF TROPONIN QUANT: CPT

## 2023-04-26 PROCEDURE — 93041 RHYTHM ECG TRACING: CPT

## 2023-04-26 PROCEDURE — 85730 THROMBOPLASTIN TIME PARTIAL: CPT

## 2023-04-26 PROCEDURE — 85025 COMPLETE CBC W/AUTO DIFF WBC: CPT

## 2023-04-26 PROCEDURE — 93005 ELECTROCARDIOGRAM TRACING: CPT

## 2023-04-26 PROCEDURE — 80053 COMPREHEN METABOLIC PANEL: CPT

## 2023-04-26 PROCEDURE — 94640 AIRWAY INHALATION TREATMENT: CPT

## 2023-04-26 PROCEDURE — 85610 PROTHROMBIN TIME: CPT

## 2023-04-26 PROCEDURE — 86141 C-REACTIVE PROTEIN HS: CPT

## 2023-04-26 NOTE — DIAGNOSTIC IMAGING REPORT
INDICATION: Dyspnea



COMPARISON: 04/18/2023.



FINDINGS: Single frontal view of the chest demonstrates mild

cardiomegaly. Pulmonary vasculature, however, is within normal

limits. Lungs show low inspiratory volumes, but are otherwise

clear. No large pleural effusion or pneumothorax is seen. The

visualized osseous structures show no acute abnormality.



IMPRESSION: Mild cardiomegaly, but no evidence of failure or

focal infiltrate. 



Dictated by: 



  Dictated on workstation # [id number]

## 2023-04-26 NOTE — ED COUGH/URI
General


Chief Complaint:  Cough/Cold/Flu Symptoms


Stated Complaint:  COUGHING


Nursing Triage Note:  


PT IS BROUGHT TO ED POV BY SPOUSE FOR A COUGH SINCE JANUARY. PER PT SHE HAS BEEN




SEEING DR. MURRAY BUT HAS BEEN GETTING WORSE. THE COUGH WON'T LET HER SLEEP AT 


NIGHT. PT AND SPOUSE AMB. TO ROOM 10.


Source:  patient





History of Present Illness


Date Seen by Provider:  Apr 26, 2023


Time Seen by Provider:  20:19


Initial Comments


PT ARRIVES VIA POV FROM HOME WITH 


C/O PRODUCTIVE COUGH SINCE JANUARY


SHE HAS HAD ONGOING SHORTNESS OF BREATH WITH IT AS WELL


SYMPTOMS HAVE BEEN WORSE SINCE LAST NIGHT "OR SUNDAY NIGHT"


NO FEVER AT ANY TIME


CHEST IS SORE AND HURTS TO COUGH





SHE STATES SHE SLEPT ALL DAY TODAY ( BECAUSE SHE WAS UP ALL NIGHT COUGHING LAST 

NIGHT) AND DIDN'T COUGH ONCE UNTIL SHE WOKE UP


SHE HAS USED A MULTITUDE OF COUGH MEDICATIONS, AND INHALERS FOR THIS PROBLEM AND

STATES "NOTHING HELPS"


SHE IS CURRENTLY ON TESSALON PERLES, TRELEGY INHALER ( SHE HAS NOT USED SINCE 

YESTERDAY), ALBUTEROL NEBULIZER--LAST USED AT 1800. 


SHE HAS BEEN ON 2 ROUNDS OF ZITHROMAX--IN FEBRUARY AND AGAIN IN MARCH--NO 

IMPROVEMENT IN SYMPTOMS





SHE DENIES HAVING COPD OR ASTHMA


SHE IS A NON-SMOKER. 





PT HAS CHRONIC RENAL INSUFFICIENCY AND STATES SHE ONLY HAS 1 KIDNEY


HER NEPROLOGIST TOOK HER OFF HER DIURETIC IN FEBRUARY DUE TO RENAL DISEASE. SHE 

HAS CHRONIC/STABLE LEG AND ANKLE/FEET SWELLING. NO CALF PAIN 


NO ORTHOPNEA





ADDITIONALLY, SHE HAS SIGNIFICANT HYPERTENSION, AND HER HYDRALAZINE HAS BEEN 

INCREASED TO 25 MG TWICE A DAY IN THE LAST MONTH OR TWO. 


SHE IS NOT ON ANY ACE INHIBITORS OR ANY ACE-2 INHIBITORS/ARB'S. 





SHE IS NOT COVID OR FLU VACCINATED.





PCP: DR. MURRAY


NEPHROLOGIST:DR. CARVALHO IN [place]





Allergies and Home Medications


Allergies


Coded Allergies:  


     baclofen (Verified  Allergy, Severe, 4/26/23)


     NSAIDS (Non-Steroidal Anti-Inflamma (Verified  Allergy, Intermediate, 

8/25/21)


   STATES SHE GETS 'CRAZY'


     Sulfa (Sulfonamide Antibiotics) (Verified  Allergy, Unknown, 8/25/21)


     ampicillin (Verified  Allergy, Unknown, 8/25/21)


     prednisone (Verified  Adverse Reaction, Unknown, 8/25/21)





Patient Home Medication List


Home Medication List Reviewed:  Yes


ALPRAZolam (ALPRAZolam) 0.25 Mg Tablet, 0.25 MG PO TID PRN for ANXIETY, 

(Reported)


   Entered as Reported by: MAGALI MCKAY on 8/3/21 1556


Allopurinol (Allopurinol) 100 Mg Tablet, 100 MG PO DAILY, (Reported)


   Entered as Reported by: MARIANN HARRY on 7/15/20 1434


Budesonide (Pulmicort) 1 Mg/2 Ml Ampul.neb, 1 MG IH BID


   Prescribed by: GRACIE PELAEZ on 4/26/23 2146


Cholecalciferol (Vitamin D3) (Vitamin D3) 25 Mcg Capsule, 25 MCG PO DAILY, 

(Reported)


   Entered as Reported by: MARIANN HARRY on 7/20/20 1218


Dexlansoprazole (Dexilant) 60 Mg Cap., 60 MG PO DAILY, (Reported)


   Entered as Reported by: MARIANN HARRY on 7/15/20 1434


Dicyclomine HCl (Dicyclomine HCl) 20 Mg Tablet, 20 MG PO QID PRN for ABDOMINAL 

CRAMPING, (Reported)


   Entered as Reported by: MARIANN HARRY on 7/20/20 1218


Doxazosin Mesylate (Doxazosin Mesylate) 2 Mg Tablet, 2 MG PO HS, (Reported)


   Entered as Reported by: MARIANN HARRY on 7/15/20 1434


Estrogens Conjugated (Premarin) 1.25 Mg Tab, 1.25 MG PO DAILY, (Reported)


   Entered as Reported by: MARIANN HARRY on 7/15/20 1434


Gabapentin (Gabapentin) 400 Mg Capsule, 400 MG PO BID, (Reported)


   Entered as Reported by: MARIANN HARRY on 7/15/20 1434


Glucos Sul 2Kcl/MSM/Chond/C/Mn (Glucosamine Chondroitin Cap) 1 Each Capsule, 1 

EACH PO DAILY, (Reported)


   Entered as Reported by: MARIANN HARRY on 7/20/20 1218


Imipramine HCl (Imipramine HCl) 50 Mg Tablet, 50 MG PO HS, (Reported)


   Entered as Reported by: MARIANN HARRY on 7/15/20 1434


Levomefolate/B6/B12/Algal Oil (Metanx Capsule) 1 Each Capsule, 1 EACH PO BID, 

(Reported)


   Entered as Reported by: MARIANN HARRY on 7/20/20 1218


Linagliptin (Tradjenta) 5 Mg Tablet, 5 MG PO DAILY, (Reported)


   Entered as Reported by: MARIANN HARRY on 7/15/20 1434


Losartan Potassium (Losartan Potassium) 100 Mg Tablet, 100 MG PO DAILY, 

(Reported)


   Entered as Reported by: MARIANN HARRY on 7/15/20 1434


Magnesium Oxide (Magnesium Oxide) 400 Mg Tablet, 400 MG PO DAILY PRN for MUSCLE 

CRAMPS, (Reported)


   Entered as Reported by: MARIANN HARRY on 7/15/20 1434


Promethazine HCl/Codeine (Prometh-Codein 6.25-10 mg/5 ml) 6.25 Mg-10 Mg/5 Ml (5 

Ml) Syrup, 5 ML PO Q4H


   Prescribed by: GRACIE PELAEZ on 4/26/23 2146





Review of Systems


Review of Systems


Constitutional:  no symptoms reported; No chills, No diaphoresis, No dizziness, 

No fever, No malaise, No weakness


EENTM:  see HPI; No nose congestion


Respiratory:  see HPI, cough, phlegm, short of breath; No wheezing


Cardiovascular:  see HPI


Gastrointestinal:  no symptoms reported


Genitourinary:  no symptoms reported


Musculoskeletal:  no symptoms reported


Skin:  no symptoms reported


Psychiatric/Neurological:  Anxiety


Hematologic/Lymphatic:  No Symptoms Reported


Immunological/Allergic:  no symptoms reported





Past Medical-Social-Family Hx


Patient Social History


Tobacco Use?:  No


Smoking Status:  Never a Smoker


Substance use?:  No


Alcohol Use?:  No


Pt feels they are or have been:  No





Immunizations Up To Date


Tetanus Booster (TDap):  Unknown


First/Initial COVID19 Vaccinat:  unknown month 2020


Second COVID19 Vaccination Chepe:  unknown month 2020


Third COVID19 Vaccination Date:  BRIAN


COVID19 Vaccine :  Moderna





Seasonal Allergies


Seasonal Allergies:  No





Past Medical History


Surgery/Hospitalization HX:  


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 





SURG.-APPENDECTOMY, CHOLECYSTECTOMY, HYST., COLONOSCOPY


Surgeries:  Yes (excision of skin cancer on right hand)


Appendectomy, Gallbladder, Hysterectomy, Nephrectomy


Respiratory:  No


Currently Using CPAP:  No


Currently Using BIPAP:  No


Cardiac:  Yes


Hypertension, Peripheral Vascular, Valvular Heart Disease


Neurological:  Yes


Neuropathy


Reproductive Disorders:  Yes


GYN History:  Hysterectomy, Menopausal


Genitourinary:  Yes (CHRONIC RENAL INSUFFICIENCY, kidney removed after injury 

from sx)


Gastrointestinal:  Yes


Gastroesophageal Reflux, Gastrointestinal Bleed, Polyps


Musculoskeletal:  Yes


Arthritis


Endocrine:  Yes


Diabetes, Non-Insulin dep


HEENT:  Yes


Cataract


Hearing Impairment:  Hard of Hearing


Cancer:  Yes


Skin


Did You Recieve Any Treatments:  Yes


What Type of Treatment Did You:  Surgical Intervention


Psychosocial:  Yes


Anxiety


Integumentary:  Yes (SKIN CANCER)


Blood Disorders:  No


Adverse Reaction/Blood Tranf:  No





Family Medical History





Cataract


  03 FATHER


  03 MOTHER


  09 BROTHER


  09 BROTHER


Dementia


  03 MOTHER


Family history: Allergy


  09 BROTHER


Family history: Arthritis


  03 MOTHER


Family history: Diabetes mellitus


  09 BROTHER


Family history: Glaucoma


  03 MOTHER


Family history: Osteoporosis


  03 MOTHER


Hereditary disease


  03 MOTHER


Prostate cancer


  03 FATHER


Stroke


  03 MOTHER





No Family History of:


  Abdominal aortic aneurysm


  Clayhole's disease


  Alcoholism


  Aphasia


  Cancer


  Cancer of colon


  Chest pain


  Congenital heart disease


  Congestive heart failure


  Cystic fibrosis


  Dysphagia


  Family history: Alzheimer's disease


  Family history: Asthma


  Family history: Breast disease


  Family history: Cardiovascular disease


  Family history: Coronary thrombosis


  Family history: Gastrointestinal disease


  Family history: Hypertension


  Family history: Thyroid disorder


  Headache


  Hearing loss


  Heart disease


  History of - anemia


  History of - disorder


  History of - respiratory disease


  History of drug abuse


  Human immunodeficiency virus (HIV) seropositivity


  Hypercholesterolemia


  Infertile


  Kidney disease


  Malignant neoplasm of lung


  Myocardial infarction


  Parkinson's disease


  Psychotic disorder


  Seizure disorder


  Tuberculosis


  Visual impairment


Cancer, Diabetes, Stroke





Physical Exam





Vital Signs - First Documented








 4/26/23 4/26/23





 20:28 20:30


 


Temp 36.9 


 


Pulse 80 


 


Resp 22 


 


B/P (MAP) 200/102 (134) 


 


Pulse Ox 91 


 


O2 Delivery Room Air 


 


O2 Flow Rate  2.00





Capillary Refill : Less Than 3 Seconds


Height: 5'0.00"


Weight: 158lbs. 0.0oz. [id number]; 30.00 BMI


Method:Stated


General Appearance:  WD/WN, no apparent distress, other (ANXIOUS, FREQUENT NON-

PRODUCTIVE COUGH. )


HEENT:  PERRL/EOMI, normal ENT inspection, TMs normal, pharynx normal, other (NO

SINUS TENDERNESS. )


Neck:  non-tender, full range of motion, supple, normal inspection


Respiratory:  normal breath sounds, no respiratory distress, no accessory muscle

use


Cardiovascular:  regular rate, rhythm, no murmur


Gastrointestinal:  non tender, soft


Extremities:  normal range of motion, non-tender, no calf tenderness, normal 

capillary refill, pedal edema (1+ EDEMA BILATERALLY)


Neurologic/Psychiatric:  CNs II-XII nml as tested, no motor/sensory deficits, 

alert, oriented x 3


Skin:  normal color, warm/dry





Progress/Results/Core Measures


Suspected Sepsis


SIRS


Temperature: 


Pulse: 80 


Respiratory Rate: 22


 


Laboratory Tests


4/26/23 20:47: White Blood Count 11.9H


Blood Pressure 200 /102 


Mean: 134


 


Laboratory Tests


4/26/23 20:47: 


Creatinine 1.41H, INR Comment 0.9, Platelet Count 236, Total Bilirubin 0.3








Results/Orders


Lab Results





Laboratory Tests








Test


 4/26/23


20:28 4/26/23


20:47 Range/Units


 


 


Influenza Type A (RT-PCR) Not Detected   Not Detecte  


 


Influenza Type B (RT-PCR) Not Detected   Not Detecte  


 


SARS-CoV-2 RNA (RT-PCR) Not Detected   Not Detecte  


 


White Blood Count


 


 11.9 H


 4.3-11.0


10^3/uL


 


Red Blood Count


 


 4.44 


 3.80-5.11


10^6/uL


 


Hemoglobin  12.6  11.5-16.0  g/dL


 


Hematocrit  39  35-52  %


 


Mean Corpuscular Volume  87  80-99  fL


 


Mean Corpuscular Hemoglobin  28  25-34  pg


 


Mean Corpuscular Hemoglobin


Concent 


 33 


 32-36  g/dL





 


Red Cell Distribution Width  13.8  10.0-14.5  %


 


Platelet Count


 


 236 


 130-400


10^3/uL


 


Mean Platelet Volume  10.6  9.0-12.2  fL


 


Immature Granulocyte % (Auto)  0   %


 


Neutrophils (%) (Auto)  79 H 42-75  %


 


Lymphocytes (%) (Auto)  15  12-44  %


 


Monocytes (%) (Auto)  5  0-12  %


 


Eosinophils (%) (Auto)  0  0-10  %


 


Basophils (%) (Auto)  0  0-10  %


 


Neutrophils # (Auto)


 


 9.4 H


 1.8-7.8


10^3/uL


 


Lymphocytes # (Auto)


 


 1.8 


 1.0-4.0


10^3/uL


 


Monocytes # (Auto)


 


 0.6 


 0.0-1.0


10^3/uL


 


Eosinophils # (Auto)


 


 0.0 


 0.0-0.3


10^3/uL


 


Basophils # (Auto)


 


 0.0 


 0.0-0.1


10^3/uL


 


Immature Granulocyte # (Auto)


 


 0.0 


 0.0-0.1


10^3/uL


 


Erythrocyte Sedimentation Rate  37 H 0-30  MM/HR


 


Prothrombin Time  12.4  12.2-14.7  SEC


 


INR Comment  0.9  0.8-1.4  


 


Activated Partial


Thromboplast Time 


 35 


 24-35  SEC





 


D-Dimer


 


 <= 0.27 


 0.00-0.49


UG/ML


 


Sodium Level  136  135-145  MMOL/L


 


Potassium Level  4.7  3.6-5.0  MMOL/L


 


Chloride Level  104    MMOL/L


 


Carbon Dioxide Level  19 L 21-32  MMOL/L


 


Anion Gap  13  5-14  MMOL/L


 


Blood Urea Nitrogen  22 H 7-18  MG/DL


 


Creatinine


 


 1.41 H


 0.60-1.30


MG/DL


 


Estimat Glomerular Filtration


Rate 


 38 


  





 


BUN/Creatinine Ratio  16   


 


Glucose Level  156 H   MG/DL


 


Calcium Level  9.9  8.5-10.1  MG/DL


 


Corrected Calcium  10.2 H 8.5-10.1  MG/DL


 


Magnesium Level  2.0  1.6-2.4  MG/DL


 


Total Bilirubin  0.3  0.1-1.0  MG/DL


 


Aspartate Amino Transf


(AST/SGOT) 


 24 


 5-34  U/L





 


Alanine Aminotransferase


(ALT/SGPT) 


 10 


 0-55  U/L





 


Alkaline Phosphatase  85    U/L


 


Total Creatine Kinase  23 L   U/L


 


Creatine Kinase MB  1.5  <6.6  NG/ML


 


Myoglobin


 


 54.3 


 10.0-92.0


NG/ML


 


Troponin I  < 0.028  <0.028  NG/ML


 


C-Reactive Protein High


Sensitivity 


 2.42 H


 0.00-0.50


MG/DL


 


B-Type Natriuretic Peptide  136.3 H <100.0  PG/ML


 


Total Protein  7.7  6.4-8.2  GM/DL


 


Albumin  3.6  3.2-4.5  GM/DL








My Orders





Orders - GRACIE PELAEZ DO


Covid 19 Inhouse Test (4/26/23 20:18)


Influenza A And B By Pcr (4/26/23 20:18)


Isolation Central Supply Req (4/26/23 20:18)


Ed Iv/Invasive Line Start (4/26/23 20:30)


Ekg Tracing (4/26/23 20:30)


O2 (4/26/23 20:30)


Monitor-Rhythm Ecg Trace Only (4/26/23 20:30)


Bnp [place] (4/26/23 20:30)


Cbc With Automated Diff (4/26/23 20:30)


Comprehensive Metabolic Panel (4/26/23 20:30)


Creatine Kinase (4/26/23 20:30)


Creatine Kinase Mb (4/26/23 20:30)


Hs C Reactive Protein (4/26/23 20:30)


Fibrin Degradation Products (4/26/23 20:30)


Magnesium (4/26/23 20:30)


Protime With Inr (4/26/23 20:30)


Partial Thromboplastin Time (4/26/23 20:30)


Erythrocyte Sedimentation Rate (4/26/23 20:30)


Myoglobin Serum (4/26/23 20:30)


Troponin I [place] (4/26/23 20:30)


Hydralazine Injection (Apresoline Inject (4/26/23 20:45)


Albuterol/Ipra Inhalation Soln (Duoneb I (4/26/23 20:45)


Rt Request For Service (4/26/23 20:32)


Svn Small Volume Nebulizer (4/26/23 20:32)


Chest 1 View, Ap/Pa Only (4/26/23 20:42)


Ct Chest Wo (4/26/23 20:42)


Promethazine/ Codeine Syrup (Phenergan W (4/26/23 21:45)





Medications Given in ED





Current Medications








 Medications  Dose


 Ordered  Sig/Mary Beth


 Route  Start Time


 Stop Time Status Last Admin


Dose Admin


 


 Albuterol/


 Ipratropium  3 ml  ONCE  ONCE


 INH  4/26/23 20:45


 4/26/23 20:46 DC 4/26/23 20:53


3 ML


 


 Hydralazine HCl  10 mg  ONCE  ONCE


 IV  4/26/23 20:45


 4/26/23 20:46 DC 4/26/23 20:47


10 MG


 


 Promethazine HCl/


 Codeine  5 ml  ONCE  ONCE


 PO  4/26/23 21:45


 4/26/23 21:46 DC 4/26/23 22:00


5 ML








Vital Signs/I&O











 4/26/23 4/26/23 4/26/23 4/26/23





 20:28 20:30 20:52 20:53


 


Temp 36.9   


 


Pulse 80   


 


Resp 22   


 


B/P (MAP) 200/102 (134)   


 


Pulse Ox 91 94  92


 


O2 Delivery Room Air Nasal Cannula Nasal Cannula Nasal Cannula


 


O2 Flow Rate  2.00  2.00


 


    





 4/26/23   





 22:05   


 


Pulse 91   


 


B/P (MAP) 191/89   


 


Pulse Ox 94   


 


O2 Delivery Nasal Cannula   


 


O2 Flow Rate 2.00   





Capillary Refill : Less Than 3 Seconds








Blood Pressure Mean:                    134








Progress Note :  


Progress Note





PLACED IN ISOLATION ROOM 


PPE WORN


COVID AND FLU TESTING DONE





GIVEN NEB TREATMENT


PT STATES SHE CANNOT TAKE PREDNISONE--CAUSES INCREASED ANXIETY. 





O2 SATS 91% ON ARRIVAL, PLACED ON O2 AT 2L/NC--O2 SAT UP TO 94%


AT DISMISSAL, SATS ARE 92-93/% ON ROOM AIR. 





BLOOD PRESSURE IS ELEVATED ON ARRIVAL, IS COMING DOWN WITHOUT TREATMENT





ALL LABS AND TESTS ARE REASSURING--NO ACUTE PROCESS IS NOTED AT THIS TIME. 





NO DETERIORATION IN PT'S CONDITION DURING ER STAY





DISCUSSED TEST RESULTS, ANTICIPATED COURSE, SYMPTOMATIC TREATMENT, MEDICATIONS, 

NEED FOR FOLLOW UP AND RETURN PRECAUTIONS. 





REVIEWED PRIOR RECORDS, INCLUDING ER VISITS, ADMITS/H&P'S/CONSULTS/DISCHARGE 

SUMMARIES, TESTS/PROCEDURES





ECG


Initial ECG Impression Date:  Apr 26, 2023


Initial ECG Impression Time:  20:49


Initial ECG Rate:  82


Initial ECG Rhythm:  Normal Sinus


Initial ECG Intervals:  Normal


Initial ECG Impression:  Normal


Initial ECG Comparisson:  Unchanged (NO SIGNIFICANT CHANGE FROM 11/2010, OTHER 

THAN IMPROVED VOLTAGE. )


Comment


INTERPRETED BY ME





Diagnostic Imaging





Comments


CXR--PER RADIOLOGIST REPORT AT 2126


-NO ACUTE PROCESS, MILD CARDIOMEGALY








CT CHEST--PER RADIOLOGIST REPORT AT 2126


-NO ACUTE PROCESS


-MILD CARDIOMEGALY


-CIRRHOSIS OF LIVER


   Reviewed:  Reviewed by Me





Departure


Communication (Admissions)


2127--SPOKE WITH DR. MURRAY, WILL TRY STEROID NEBULIZER MEDICATION, AS WELL AS 

PHENERGAN WITH CODEINE COUGH SYRUP TO HER CURRENT MEDICATIONS, AND SHE WILL 

FOLLOW UP IN CLINIC IN THE NEXT WEEK. SHE AND NEPHROLOGIST HAVE BOTH TRIED A 

MULTITUDE OF TREATMENTS FOR THIS PROBLEM. THERE IS SOME SPECULATION THAT SHE MAY

HAVE HAD COVID IN JANUARY, AND IS HAVING POST-COVID CHRONIC COUGH/RESPIRATORY 

ISSUES.





Impression





   Primary Impression:  


   Unexplained chronic cough


Disposition:  01 HOME, SELF-CARE


Condition:  Stable





Departure-Patient Inst.


Decision time for Depature:  21:43


Referrals:  


KAYLEY MURRAY DO (PCP/Family)


Primary Care Physician


Patient Instructions:  Cough, Adult ED





Add. Discharge Instructions:  


CONTINUE YOUR REGULAR MEDICATIONS AS PRESCRIBED





CONTINUE ALBUTEROL NEBULIZER TREATMENTS EVERY 4 HOURS AS NEEDED





WILL BE ADDING ANOTHER NEBULIZER MEDICATION FOR YOU TO USE TWICE A DAY EVERY DAY





CONTINUE YOUR TESSALON PERLES THREE TIMES A DAY FOR COUGH





FOLLOW UP WITH DR. MURRAY THIS WEEK FOR FURTHER CARE





All discharge instructions reviewed with patient and/or family. Voiced 

understanding.


Scripts


Budesonide (Pulmicort) 1 Mg/2 Ml Ampul.neb


1 MG IH BID, #1 EA


   Prov: GRACIE PELAEZ DO         4/26/23 


Promethazine HCl/Codeine (Prometh-Codein 6.25-10 mg/5 ml) 6.25 Mg-10 Mg/5 Ml (5 

Ml) Syrup


5 ML PO Q4H for Cough, #120 ML


   Prov: GRACIE PELAEZ DO         4/26/23











GRACIE PELAEZ DO                 Apr 26, 2023 21:46

## 2023-04-26 NOTE — DIAGNOSTIC IMAGING REPORT
PROCEDURE: CT chest without contrast.



TECHNIQUE: Multiple contiguous axial images were obtained through

the chest without the use of intravenous contrast. Auto Exposure

Controls were utilized during the CT exam to meet ALARA standards

for radiation dose reduction. 



INDICATION: Cough. Dyspnea.



COMPARISON: 06/06/2021.



FINDINGS: Cardiomediastinal structures show mild cardiomegaly.

There is no large pericardial effusion. There is mild to moderate

scattered calcified aortic atherosclerosis. Mild calcified

coronary atherosclerotic disease is also present. A few small

mediastinal lymph nodes are noted. No pathologically enlarged or

morphologically abnormal adenopathy is seen within the frantz or

axilla.



Lungs are clear. There is no focal consolidation, large effusion

or pneumothorax. No suspicious pulmonary nodule or mass is seen.



Osseous structures show age-related degenerative changes. No

lytic or blastic bony lesions are seen. Included portions of the

upper abdomen show cirrhotic morphology to the liver.



IMPRESSION:

1. No acute cardiopulmonary process.

2. Mild cardiomegaly.

3. Cirrhosis of the liver.



Dictated by: 



  Dictated on workstation # [id number]

## 2023-07-19 ENCOUNTER — HOSPITAL ENCOUNTER (EMERGENCY)
Dept: HOSPITAL 75 - ER | Age: 78
Discharge: HOME | End: 2023-07-19
Payer: MEDICARE

## 2023-07-19 VITALS — DIASTOLIC BLOOD PRESSURE: 88 MMHG | SYSTOLIC BLOOD PRESSURE: 136 MMHG

## 2023-07-19 VITALS — WEIGHT: 154.98 LBS | BODY MASS INDEX: 30.43 KG/M2 | HEIGHT: 60 IN
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
## 2023-07-19 DIAGNOSIS — E11.22: ICD-10-CM

## 2023-07-19 DIAGNOSIS — E11.42: ICD-10-CM

## 2023-07-19 DIAGNOSIS — N18.9: ICD-10-CM

## 2023-07-19 DIAGNOSIS — I12.9: Primary | ICD-10-CM

## 2023-07-19 LAB
ALBUMIN SERPL-MCNC: 3.2 GM/DL (ref 3.2–4.5)
ALP SERPL-CCNC: 72 U/L (ref 40–136)
ALT SERPL-CCNC: 11 U/L (ref 0–55)
APTT PPP: YELLOW S
BACTERIA #/AREA URNS HPF: NEGATIVE /HPF
BASOPHILS # BLD AUTO: 0 10^3/UL (ref 0–0.1)
BASOPHILS NFR BLD AUTO: 0 % (ref 0–10)
BILIRUB SERPL-MCNC: 0.3 MG/DL (ref 0.1–1)
BILIRUB UR QL STRIP: NEGATIVE
BUN/CREAT SERPL: 32
CALCIUM SERPL-MCNC: 9.7 MG/DL (ref 8.5–10.1)
CHLORIDE SERPL-SCNC: 89 MMOL/L (ref 98–107)
CO2 SERPL-SCNC: 24 MMOL/L (ref 21–32)
CREAT SERPL-MCNC: 2.22 MG/DL (ref 0.6–1.3)
EOSINOPHIL # BLD AUTO: 0 10^3/UL (ref 0–0.3)
EOSINOPHIL NFR BLD AUTO: 0 % (ref 0–10)
FIBRINOGEN PPP-MCNC: CLEAR MG/DL
GFR SERPLBLD BASED ON 1.73 SQ M-ARVRAT: 22 ML/MIN
GLUCOSE SERPL-MCNC: 147 MG/DL (ref 70–105)
GLUCOSE UR STRIP-MCNC: NEGATIVE MG/DL
HCT VFR BLD CALC: 31 % (ref 35–52)
HGB BLD-MCNC: 9.9 G/DL (ref 11.5–16)
KETONES UR QL STRIP: NEGATIVE
LEUKOCYTE ESTERASE UR QL STRIP: NEGATIVE
LYMPHOCYTES # BLD AUTO: 2.2 10^3/UL (ref 1–4)
LYMPHOCYTES NFR BLD AUTO: 19 % (ref 12–44)
MANUAL DIFFERENTIAL PERFORMED BLD QL: NO
MCH RBC QN AUTO: 28 PG (ref 25–34)
MCHC RBC AUTO-ENTMCNC: 32 G/DL (ref 32–36)
MCV RBC AUTO: 87 FL (ref 80–99)
MONOCYTES # BLD AUTO: 1 10^3/UL (ref 0–1)
MONOCYTES NFR BLD AUTO: 9 % (ref 0–12)
NEUTROPHILS # BLD AUTO: 8.4 10^3/UL (ref 1.8–7.8)
NEUTROPHILS NFR BLD AUTO: 71 % (ref 42–75)
NITRITE UR QL STRIP: NEGATIVE
PH UR STRIP: 6 [PH] (ref 5–9)
PLATELET # BLD: 202 10^3/UL (ref 130–400)
PMV BLD AUTO: 11.3 FL (ref 9–12.2)
POTASSIUM SERPL-SCNC: 3 MMOL/L (ref 3.6–5)
PROT SERPL-MCNC: 7.1 GM/DL (ref 6.4–8.2)
PROT UR QL STRIP: (no result)
RBC #/AREA URNS HPF: (no result) /HPF
SODIUM SERPL-SCNC: 130 MMOL/L (ref 135–145)
SP GR UR STRIP: 1.01 (ref 1.02–1.02)
SQUAMOUS #/AREA URNS HPF: (no result) /HPF
WBC # BLD AUTO: 11.8 10^3/UL (ref 4.3–11)
WBC #/AREA URNS HPF: (no result) /HPF

## 2023-07-19 PROCEDURE — 36415 COLL VENOUS BLD VENIPUNCTURE: CPT

## 2023-07-19 PROCEDURE — 85025 COMPLETE CBC W/AUTO DIFF WBC: CPT

## 2023-07-19 PROCEDURE — 80053 COMPREHEN METABOLIC PANEL: CPT

## 2023-07-19 PROCEDURE — 72125 CT NECK SPINE W/O DYE: CPT

## 2023-07-19 PROCEDURE — 72070 X-RAY EXAM THORAC SPINE 2VWS: CPT

## 2023-07-19 PROCEDURE — 70450 CT HEAD/BRAIN W/O DYE: CPT

## 2023-07-19 PROCEDURE — 72100 X-RAY EXAM L-S SPINE 2/3 VWS: CPT

## 2023-07-19 PROCEDURE — 81000 URINALYSIS NONAUTO W/SCOPE: CPT

## 2023-07-19 NOTE — DIAGNOSTIC IMAGING REPORT
HISTORY: Low back pain.



TECHNIQUE: 3 views of the lumbar spine.



COMPARISON: None



FINDINGS:



There is mild left convex curvature of the lumbar spine. No

spondylolisthesis is seen. There is marked degenerative change at

L3-L4 with mild degenerative changes elsewhere in the lower

lumbar spine. Vertebral body heights are preserved. There is

facet arthropathy in the lower lumbar spine. No acute fracture

seen. There is calcific atherosclerosis. Surgical clips are

noted. Bilateral sacroiliac joints are patent. There does appear

to be a calcification projecting over the left kidney.



IMPRESSION:

1. Degenerative changes in the lumbar spine, most pronounced at

L3-L4. No acute fracture seen.



Dictated by: 



  Dictated on workstation # [id number]

## 2023-07-19 NOTE — ED FALL/INJURY
General


Chief Complaint:  Trauma-Non Activation


Stated Complaint:  FALL


Nursing Triage Note:  


PT TO ROOM 07 VIA [facility] WITH C/O FALLING LAST NIGHT AT 2100. PT DENIES LOC, N/V.




PT REPORTS HITTING HEAD, NECK, AND BACK. PT REPORTS HE LEGS "JUST GAVE OUT".





History of Present Illness


Date Seen by Provider:  Jul 19, 2023


Time Seen by Provider:  09:45


Initial Comments


77-year-old female presents following a fall last night around 11 PM.  She 

denies any loss of consciousness.  She reports that her "legs just gave out" 

that she had neck and back.  Patient has had diffuse pain all over but this is 

not abnormal for her.  Patient complains of bilateral lower extremity pain which

is normal due to peripheral neuropathy.  States that 3 days ago she was started 

on a new medication is just felt a little bit weaker since then.  Any chest 

pain, nausea, vomiting or any other systemic complaints.





Allergies and Home Medications


Allergies


Coded Allergies:  


     baclofen (Verified  Allergy, Severe, 4/26/23)


     NSAIDS (Non-Steroidal Anti-Inflamma (Verified  Allergy, Intermediate, 

8/25/21)


   STATES SHE GETS 'CRAZY'


     Sulfa (Sulfonamide Antibiotics) (Verified  Allergy, Unknown, 8/25/21)


     ampicillin (Verified  Allergy, Unknown, 8/25/21)


     prednisone (Verified  Adverse Reaction, Unknown, 8/25/21)





Patient Home Medication List


Home Medication List Reviewed:  Yes


ALPRAZolam (ALPRAZolam) 0.25 Mg Tablet, 0.25 MG PO TID PRN for ANXIETY, 

(Reported)


   Entered as Reported by: MAGALI MCKAY on 8/3/21 1556


Allopurinol (Allopurinol) 100 Mg Tablet, 100 MG PO DAILY, (Reported)


   Entered as Reported by: MARIANN HARRY on 7/15/20 1434


Budesonide (Pulmicort) 1 Mg/2 Ml Ampul.neb, 1 MG IH BID


   Prescribed by: GRACIE PELAEZ on 4/26/23 2146


Cholecalciferol (Vitamin D3) (Vitamin D3) 25 Mcg Capsule, 25 MCG PO DAILY, 

(Reported)


   Entered as Reported by: MARIANN HARRY on 7/20/20 1218


Dexlansoprazole (Dexilant) 60 Mg Cap.dr.bp, 60 MG PO DAILY, (Reported)


   Entered as Reported by: MARIANN HARRY on 7/15/20 1434


Dicyclomine HCl (Dicyclomine HCl) 20 Mg Tablet, 20 MG PO QID PRN for ABDOMINAL 

CRAMPING, (Reported)


   Entered as Reported by: MARIANN HARRY on 7/20/20 1218


Doxazosin Mesylate (Doxazosin Mesylate) 2 Mg Tablet, 2 MG PO HS, (Reported)


   Entered as Reported by: MARIANN HARRY on 7/15/20 1434


Estrogens Conjugated (Premarin) 1.25 Mg Tab, 1.25 MG PO DAILY, (Reported)


   Entered as Reported by: MARIANN HARRY on 7/15/20 1434


Gabapentin (Gabapentin) 400 Mg Capsule, 400 MG PO BID, (Reported)


   Entered as Reported by: MARIANN HARRY on 7/15/20 1434


Glucos Sul 2Kcl/MSM/Chond/C/Mn (Glucosamine Chondroitin Cap) 1 Each Capsule, 1 

EACH PO DAILY, (Reported)


   Entered as Reported by: MARIANN HARRY on 7/20/20 1218


Imipramine HCl (Imipramine HCl) 50 Mg Tablet, 50 MG PO HS, (Reported)


   Entered as Reported by: MARIANN HARRY on 7/15/20 1434


Levomefolate/B6/B12/Algal Oil (Metanx Capsule) 1 Each Capsule, 1 EACH PO BID, 

(Reported)


   Entered as Reported by: MARIANN HARRY on 7/20/20 1218


Linagliptin (Tradjenta) 5 Mg Tablet, 5 MG PO DAILY, (Reported)


   Entered as Reported by: MARIANN HARRY on 7/15/20 1434


Losartan Potassium (Losartan Potassium) 100 Mg Tablet, 100 MG PO DAILY, 

(Reported)


   Entered as Reported by: MARIANN HARRY on 7/15/20 1434


Magnesium Oxide (Magnesium Oxide) 400 Mg Tablet, 400 MG PO DAILY PRN for MUSCLE 

CRAMPS, (Reported)


   Entered as Reported by: MARIANN HARRY on 7/15/20 1434


Promethazine HCl/Codeine (Prometh-Codein 6.25-10 mg/5 ml) 6.25 Mg-10 Mg/5 Ml (5 

Ml) Syrup, 5 ML PO Q4H


   Prescribed by: GRACIE PELAEZ on 4/26/23 2146





Review of Systems


Review of Systems


Constitutional:  weakness


Ears, Nose, Mouth, Throat:  no symptoms reported


Cardiovascular:  no symptoms reported


Gastrointestinal:  no symptoms reported


Genitourinary:  no symptoms reported


Musculoskeletal:  see HPI


Skin:  no symptoms reported


Psychiatric/Neurological:  No Symptoms Reported





Past Medical-Social-Family Hx


Patient Social History


Tobacco Use?:  No


Smoking Status:  Never a Smoker


Smokeless Tobacco Frequency:  Never a User


Use of E-Cig and/or Vaping dev:  No


Use of E-Cig and/or Vaping Quintin:  Never a User


Substance use?:  No


Alcohol Use?:  No


Pt feels they are or have been:  No





Immunizations Up To Date


Tetanus Booster (TDap):  Unknown


First/Initial COVID19 Vaccinat:  unknown month 2020


Second COVID19 Vaccination Chepe:  unknown month 2020


Third COVID19 Vaccination Date:  NA





Seasonal Allergies


Seasonal Allergies:  No





Past Medical History


Surgery/Hospitalization HX:  


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 





SURG.-APPENDECTOMY, CHOLECYSTECTOMY, HYST., COLONOSCOPY


Surgeries:  Yes (excision of skin cancer on right hand)


Appendectomy, Gallbladder, Hysterectomy, Nephrectomy


Respiratory:  No


Currently Using CPAP:  No


Currently Using BIPAP:  No


Cardiac:  Yes


Hypertension, Peripheral Vascular, Valvular Heart Disease


Neurological:  Yes


Neuropathy


Reproductive Disorders:  Yes


GYN History:  Hysterectomy, Menopausal


Genitourinary:  Yes (CHRONIC RENAL INSUFFICIENCY, kidney removed after injury 

from sx)


Gastrointestinal:  Yes


Gastroesophageal Reflux, Gastrointestinal Bleed, Polyps


Musculoskeletal:  Yes


Arthritis


Endocrine:  Yes


Diabetes, Non-Insulin dep


HEENT:  Yes


Cataract


Hearing Impairment:  Hard of Hearing


Cancer:  Yes


Skin


Did You Recieve Any Treatments:  Yes


What Type of Treatment Did You:  Surgical Intervention


Psychosocial:  Yes


Anxiety


Integumentary:  Yes (SKIN CANCER)


Blood Disorders:  No


Adverse Reaction/Blood Tranf:  No





Family Medical History





Cataract


  03 FATHER


  03 MOTHER


  09 BROTHER


  09 BROTHER


Dementia


  03 MOTHER


Family history: Allergy


  09 BROTHER


Family history: Arthritis


  03 MOTHER


Family history: Diabetes mellitus


  09 BROTHER


Family history: Glaucoma


  03 MOTHER


Family history: Osteoporosis


  03 MOTHER


Hereditary disease


  03 MOTHER


Prostate cancer


  03 FATHER


Stroke


  03 MOTHER





No Family History of:


  Abdominal aortic aneurysm


  Iroquois's disease


  Alcoholism


  Aphasia


  Cancer


  Cancer of colon


  Chest pain


  Congenital heart disease


  Congestive heart failure


  Cystic fibrosis


  Dysphagia


  Family history: Alzheimer's disease


  Family history: Asthma


  Family history: Breast disease


  Family history: Cardiovascular disease


  Family history: Coronary thrombosis


  Family history: Gastrointestinal disease


  Family history: Hypertension


  Family history: Thyroid disorder


  Headache


  Hearing loss


  Heart disease


  History of - anemia


  History of - disorder


  History of - respiratory disease


  History of drug abuse


  Human immunodeficiency virus (HIV) seropositivity


  Hypercholesterolemia


  Infertile


  Kidney disease


  Malignant neoplasm of lung


  Myocardial infarction


  Parkinson's disease


  Psychotic disorder


  Seizure disorder


  Tuberculosis


  Visual impairment


Cancer, Diabetes, Stroke





Physical Exam


Vital Signs





Vital Signs - First Documented








 7/19/23





 09:40


 


Temp 36.8


 


Pulse 68


 


Resp 17


 


B/P (MAP) 142/56 (84)


 


Pulse Ox 97


 


O2 Delivery Room Air





Capillary Refill : Less Than 3 Seconds


Height, Weight, BMI


Height: 5'0.00"


Weight: 158lbs. 0.0oz. [id number]; 30.00 BMI


Method:Stated


General Appearance:  WD/WN, no apparent distress


Neck:  other (c- Collar prior to arrival)


Cardiovascular:  regular rate, rhythm, other (1-2+ edema bilateral)


Respiratory:  lungs clear, normal breath sounds


Extremities:  normal range of motion


Neurologic/Psychiatric:  alert, normal mood/affect, oriented x 3


Skin:  normal color, warm/dry





Progress/Results/Core Measures


Results/Orders


Lab Results





Laboratory Tests








Test


 7/19/23


09:53 7/19/23


10:26 Range/Units


 


 


White Blood Count


 11.8 H


 


 4.3-11.0


10^3/uL


 


Red Blood Count


 3.59 L


 


 3.80-5.11


10^6/uL


 


Hemoglobin 9.9 L  11.5-16.0  g/dL


 


Hematocrit 31 L  35-52  %


 


Mean Corpuscular Volume 87   80-99  fL


 


Mean Corpuscular Hemoglobin 28   25-34  pg


 


Mean Corpuscular Hemoglobin


Concent 32 


 


 32-36  g/dL





 


Red Cell Distribution Width 14.6 H  10.0-14.5  %


 


Platelet Count


 202 


 


 130-400


10^3/uL


 


Mean Platelet Volume 11.3   9.0-12.2  fL


 


Immature Granulocyte % (Auto) 1    %


 


Neutrophils (%) (Auto) 71   42-75  %


 


Lymphocytes (%) (Auto) 19   12-44  %


 


Monocytes (%) (Auto) 9   0-12  %


 


Eosinophils (%) (Auto) 0   0-10  %


 


Basophils (%) (Auto) 0   0-10  %


 


Neutrophils # (Auto)


 8.4 H


 


 1.8-7.8


10^3/uL


 


Lymphocytes # (Auto)


 2.2 


 


 1.0-4.0


10^3/uL


 


Monocytes # (Auto)


 1.0 


 


 0.0-1.0


10^3/uL


 


Eosinophils # (Auto)


 0.0 


 


 0.0-0.3


10^3/uL


 


Basophils # (Auto)


 0.0 


 


 0.0-0.1


10^3/uL


 


Immature Granulocyte # (Auto)


 0.1 


 


 0.0-0.1


10^3/uL


 


Sodium Level 130 L  135-145  MMOL/L


 


Potassium Level 3.0 L  3.6-5.0  MMOL/L


 


Chloride Level 89 L    MMOL/L


 


Carbon Dioxide Level 24   21-32  MMOL/L


 


Anion Gap 17 H  5-14  MMOL/L


 


Blood Urea Nitrogen 72 H  7-18  MG/DL


 


Creatinine


 2.22 H


 


 0.60-1.30


MG/DL


 


Estimat Glomerular Filtration


Rate 22 


 


  





 


BUN/Creatinine Ratio 32    


 


Glucose Level 147 H    MG/DL


 


Calcium Level 9.7   8.5-10.1  MG/DL


 


Corrected Calcium 10.3 H  8.5-10.1  MG/DL


 


Total Bilirubin 0.3   0.1-1.0  MG/DL


 


Aspartate Amino Transf


(AST/SGOT) 33 


 


 5-34  U/L





 


Alanine Aminotransferase


(ALT/SGPT) 11 


 


 0-55  U/L





 


Alkaline Phosphatase 72     U/L


 


Total Protein 7.1   6.4-8.2  GM/DL


 


Albumin 3.2   3.2-4.5  GM/DL


 


Urine Color  YELLOW   


 


Urine Clarity  CLEAR   


 


Urine pH  6.0  5-9  


 


Urine Specific Gravity  1.010 L 1.016-1.022  


 


Urine Protein  TRACE H NEGATIVE  


 


Urine Glucose (UA)  NEGATIVE  NEGATIVE  


 


Urine Ketones  NEGATIVE  NEGATIVE  


 


Urine Nitrite  NEGATIVE  NEGATIVE  


 


Urine Bilirubin  NEGATIVE  NEGATIVE  


 


Urine Urobilinogen  0.2  < = 1.0  MG/DL


 


Urine Leukocyte Esterase  NEGATIVE  NEGATIVE  


 


Urine RBC (Auto)  NEGATIVE  NEGATIVE  


 


Urine RBC  NONE   /HPF


 


Urine WBC  NONE   /HPF


 


Urine Squamous Epithelial


Cells 


 0-2 


  /HPF





 


Urine Crystals  NONE   /LPF


 


Urine Bacteria  NEGATIVE   /HPF


 


Urine Casts  NONE   /LPF


 


Urine Mucus  NEGATIVE   /LPF


 


Urine Culture Indicated  NO   








My Orders





Orders - ELIDA FOX DO


Ct Head/Cervical Spine Wo (7/19/23 09:47)


Thoracic Spine, 2 Views Only (7/19/23 09:47)


Lumbar Spine - 2-3 Views (7/19/23 09:47)


Cbc With Automated Diff (7/19/23 09:47)


Comprehensive Metabolic Panel (7/19/23 09:47)


Ua Culture If Indicated (7/19/23 09:47)


Ed Iv/Invasive Line Start (7/19/23 10:27)


Ns Iv 500 Ml (Sodium Chloride 0.9%) (7/19/23 10:30)





Medications Given in ED





Current Medications








 Medications  Dose


 Ordered  Sig/Mary Beth


 Route  Start Time


 Stop Time Status Last Admin


Dose Admin


 


 Sodium Chloride  500 ml @ 0


 mls/hr  Q0M ONCE


 IV  7/19/23 10:30


 7/19/23 10:31 DC 7/19/23 10:39


999 MLS/HR








Vital Signs/I&O











 7/19/23 7/19/23 7/19/23





 09:40 09:40 11:14


 


Temp 36.8 36.8 36.2


 


Pulse 68 68 69


 


Resp 17 17 16


 


B/P (MAP) 142/56 (84) 142/56 (84) 136/88


 


Pulse Ox  97 97


 


O2 Delivery Room Air Room Air Room Air














Blood Pressure Mean:                    84











Progress


Progress Note :  


Progress Note


Diagnostic studies were ordered reviewed and interpreted by me.  Patient with 

mild increase in her BUN and creatinine from baseline.  She was given a small 

amount of fluids.  Patient's imaging studies were ordered and reviewed by me 

with final interpretation per radiology report with no acute findings.  

Discussed with patient is likely due to her recent medication change that she 

has a little bit of his generalized weakness.  Recommended she follow follow-up 

with her primary care provider on outpatient basis to discuss her recent 

medication change and also to review her medications for her peripheral 

neuropathy which seems to be a significant plan hers.  At this time there is no 

indications for admission.  Patient was given a small amount of IV fluids to 

help with her mild increase in her BUN and creatinine.  Patient is at increased 

risk for morbidity and mortality based on her social determinants of health.  

She is stable upon discharge.





Diagnostic Imaging





   Diagonstic Imaging:  CT


   Plain Films/CT/US/NM/MRI:  c-spine, head


Comments


Date of Exam:07/19/23





CT HEAD/CERVICAL SPINE WO








PROCEDURE: CT head and CT cervical spine without contrast.





TECHNIQUE: Multiple contiguous axial images were obtained through


the brain and cervical spine without the use of intravenous


contrast. Sagittal and coronal reformations through the cervical


spine were then performed. Auto Exposure Controls were utilized


during the CT exam to meet ALARA standards for radiation dose


reduction. 





INDICATION:  Fall with head and neck pain.





Correlation is made with prior CT from 06/06/2021.





CT HEAD:





The ventricles and sulci are within normal limits. No sulcal


effacement or midline shift is identified. No acute intra-axial


or extra-axial hemorrhage is detected. Cisterns are patent.


Visualized paranasal sinuses are clear.





IMPRESSION: No acute intracranial process is identified.





CT cervical spine:





Curvature and alignment of the cervical spine is normal. Is


multilevel degenerative disc and facet disease. Is variable disc


space narrowing and marginal spurring noted. No fractures are


identified. Prevertebral tissues are within normal limits.


Odontoid is intact.





IMPRESSION: Cervical spondylosis. No acute bony abnormality is


detected.


   Reviewed:  Reviewed by Me, Reviewed/Discussed








   Diagonstic Imaging:  Xray


   Plain Films/CT/US/NM/MRI:  other


Comments


Date of Exam:07/19/23





LUMBAR SPINE - 2-3 VIEWS








HISTORY: Low back pain.





TECHNIQUE: 3 views of the lumbar spine.





COMPARISON: None





FINDINGS:





There is mild left convex curvature of the lumbar spine. No


spondylolisthesis is seen. There is marked degenerative change at


L3-L4 with mild degenerative changes elsewhere in the lower


lumbar spine. Vertebral body heights are preserved. There is


facet arthropathy in the lower lumbar spine. No acute fracture


seen. There is calcific atherosclerosis. Surgical clips are


noted. Bilateral sacroiliac joints are patent. There does appear


to be a calcification projecting over the left kidney.





IMPRESSION:


1. Degenerative changes in the lumbar spine, most pronounced at


L3-L4. No acute fracture seen.














THORACIC SPINE, 2 VIEWS ONLY





INDICATION: Back pain





COMPARISON: CT thoracic spine from 06/16/2020





TECHNIQUE: 2 views of thoracic spine





FINDINGS: Kyphotic curvature is normal. No spondylolisthesis. No


compression formed within the vertebral bodies. Intervertebral


disc space heights are fairly well-preserved. No ankylosis within


the thoracic spine. Cholecystectomy clips are noted.





IMPRESSION: No acute fracture within the thoracic spine.


   Reviewed:  Reviewed by Me, Reviewed/Discussed





Departure


Impression





   Primary Impression:  


   Fall on same level


   Qualified Codes:  W18.30XA - Fall on same level, unspecified, initial 

   encounter


   Additional Impressions:  


   Chronic kidney disease


   Qualified Codes:  N18.9 - Chronic kidney disease, unspecified


   Generalized weakness


Disposition:  01 HOME, SELF-CARE


Condition:  Stable





Departure-Patient Inst.


Referrals:  


KAYLEY JONES DO (PCP/Family)


Primary Care Physician


Patient Instructions:  Chronic Kidney Disease (DC), Generalized Weakness, Minor 

Head Injury (DC)





Add. Discharge Instructions:  


please follow up with Dr Jones for recheck of symptoms and further outpt 

management 





All discharge instructions reviewed with patient and/or family. Voiced 

understanding.











ELIDA FOX DO               Jul 19, 2023 11:01

## 2023-07-19 NOTE — DIAGNOSTIC IMAGING REPORT
PROCEDURE: CT head and CT cervical spine without contrast.



TECHNIQUE: Multiple contiguous axial images were obtained through

the brain and cervical spine without the use of intravenous

contrast. Sagittal and coronal reformations through the cervical

spine were then performed. Auto Exposure Controls were utilized

during the CT exam to meet ALARA standards for radiation dose

reduction. 



INDICATION:  Fall with head and neck pain.



Correlation is made with prior CT from 06/06/2021.



CT HEAD:



The ventricles and sulci are within normal limits. No sulcal

effacement or midline shift is identified. No acute intra-axial

or extra-axial hemorrhage is detected. Cisterns are patent.

Visualized paranasal sinuses are clear.



IMPRESSION: No acute intracranial process is identified.



CT cervical spine:



Curvature and alignment of the cervical spine is normal. Is

multilevel degenerative disc and facet disease. Is variable disc

space narrowing and marginal spurring noted. No fractures are

identified. Prevertebral tissues are within normal limits.

Odontoid is intact.



IMPRESSION: Cervical spondylosis. No acute bony abnormality is

detected.



 



Dictated by: 



  Dictated on workstation # [id number]

## 2023-07-19 NOTE — DIAGNOSTIC IMAGING REPORT
THORACIC SPINE, 2 VIEWS ONLY



INDICATION: Back pain



COMPARISON: CT thoracic spine from 06/16/2020



TECHNIQUE: 2 views of thoracic spine



FINDINGS: Kyphotic curvature is normal. No spondylolisthesis. No

compression formed within the vertebral bodies. Intervertebral

disc space heights are fairly well-preserved. No ankylosis within

the thoracic spine. Cholecystectomy clips are noted.



IMPRESSION: No acute fracture within the thoracic spine.



Dictated by: 



  Dictated on workstation # [id number]